# Patient Record
Sex: MALE | Race: OTHER | HISPANIC OR LATINO | Employment: FULL TIME | URBAN - METROPOLITAN AREA
[De-identification: names, ages, dates, MRNs, and addresses within clinical notes are randomized per-mention and may not be internally consistent; named-entity substitution may affect disease eponyms.]

---

## 2020-10-19 ENCOUNTER — OFFICE VISIT (OUTPATIENT)
Dept: INTERNAL MEDICINE CLINIC | Facility: CLINIC | Age: 30
End: 2020-10-19

## 2020-10-19 VITALS
TEMPERATURE: 96.5 F | BODY MASS INDEX: 33.6 KG/M2 | SYSTOLIC BLOOD PRESSURE: 130 MMHG | HEIGHT: 71 IN | HEART RATE: 74 BPM | WEIGHT: 240 LBS | DIASTOLIC BLOOD PRESSURE: 78 MMHG | OXYGEN SATURATION: 98 %

## 2020-10-19 DIAGNOSIS — E66.9 OBESITY (BMI 30.0-34.9): ICD-10-CM

## 2020-10-19 DIAGNOSIS — Z11.4 SCREENING FOR HIV (HUMAN IMMUNODEFICIENCY VIRUS): ICD-10-CM

## 2020-10-19 DIAGNOSIS — Z00.00 ROUTINE ADULT HEALTH MAINTENANCE: Primary | ICD-10-CM

## 2020-10-19 DIAGNOSIS — Z13.0 SCREENING FOR DEFICIENCY ANEMIA: ICD-10-CM

## 2020-10-19 DIAGNOSIS — Z13.29 SCREENING FOR THYROID DISORDER: ICD-10-CM

## 2020-10-19 DIAGNOSIS — Z13.220 SCREENING, LIPID: ICD-10-CM

## 2020-10-19 DIAGNOSIS — Z13.1 SCREENING FOR DIABETES MELLITUS: ICD-10-CM

## 2020-10-19 PROCEDURE — 3725F SCREEN DEPRESSION PERFORMED: CPT | Performed by: PHYSICIAN ASSISTANT

## 2020-10-19 PROCEDURE — 1036F TOBACCO NON-USER: CPT | Performed by: PHYSICIAN ASSISTANT

## 2020-10-19 PROCEDURE — 99385 PREV VISIT NEW AGE 18-39: CPT | Performed by: PHYSICIAN ASSISTANT

## 2020-10-19 PROCEDURE — 3008F BODY MASS INDEX DOCD: CPT | Performed by: PHYSICIAN ASSISTANT

## 2020-10-28 ENCOUNTER — LAB (OUTPATIENT)
Dept: LAB | Facility: HOSPITAL | Age: 30
End: 2020-10-28
Payer: COMMERCIAL

## 2020-10-28 DIAGNOSIS — Z13.1 SCREENING FOR DIABETES MELLITUS: ICD-10-CM

## 2020-10-28 DIAGNOSIS — Z13.220 SCREENING, LIPID: ICD-10-CM

## 2020-10-28 DIAGNOSIS — Z11.4 SCREENING FOR HIV (HUMAN IMMUNODEFICIENCY VIRUS): ICD-10-CM

## 2020-10-28 DIAGNOSIS — Z13.0 SCREENING FOR DEFICIENCY ANEMIA: ICD-10-CM

## 2020-10-28 DIAGNOSIS — Z13.29 SCREENING FOR THYROID DISORDER: ICD-10-CM

## 2020-10-28 DIAGNOSIS — E66.9 OBESITY (BMI 30.0-34.9): ICD-10-CM

## 2020-10-28 LAB
ALBUMIN SERPL BCP-MCNC: 3.9 G/DL (ref 3.5–5)
ALP SERPL-CCNC: 87 U/L (ref 46–116)
ALT SERPL W P-5'-P-CCNC: 57 U/L (ref 12–78)
ANION GAP SERPL CALCULATED.3IONS-SCNC: 12 MMOL/L (ref 4–13)
AST SERPL W P-5'-P-CCNC: 19 U/L (ref 5–45)
BASOPHILS # BLD AUTO: 0.03 THOUSANDS/ΜL (ref 0–0.1)
BASOPHILS NFR BLD AUTO: 0 % (ref 0–1)
BILIRUB SERPL-MCNC: 0.32 MG/DL (ref 0.2–1)
BUN SERPL-MCNC: 14 MG/DL (ref 5–25)
CALCIUM SERPL-MCNC: 9.6 MG/DL (ref 8.3–10.1)
CHLORIDE SERPL-SCNC: 102 MMOL/L (ref 100–108)
CHOLEST SERPL-MCNC: 186 MG/DL (ref 50–200)
CO2 SERPL-SCNC: 25 MMOL/L (ref 21–32)
CREAT SERPL-MCNC: 0.88 MG/DL (ref 0.6–1.3)
EOSINOPHIL # BLD AUTO: 0.08 THOUSAND/ΜL (ref 0–0.61)
EOSINOPHIL NFR BLD AUTO: 1 % (ref 0–6)
ERYTHROCYTE [DISTWIDTH] IN BLOOD BY AUTOMATED COUNT: 11.9 % (ref 11.6–15.1)
GFR SERPL CREATININE-BSD FRML MDRD: 115 ML/MIN/1.73SQ M
GLUCOSE P FAST SERPL-MCNC: 147 MG/DL (ref 65–99)
HCT VFR BLD AUTO: 48.7 % (ref 36.5–49.3)
HDLC SERPL-MCNC: 34 MG/DL
HGB BLD-MCNC: 16 G/DL (ref 12–17)
IMM GRANULOCYTES # BLD AUTO: 0.02 THOUSAND/UL (ref 0–0.2)
IMM GRANULOCYTES NFR BLD AUTO: 0 % (ref 0–2)
LDLC SERPL CALC-MCNC: 125 MG/DL (ref 0–100)
LYMPHOCYTES # BLD AUTO: 3.12 THOUSANDS/ΜL (ref 0.6–4.47)
LYMPHOCYTES NFR BLD AUTO: 42 % (ref 14–44)
MCH RBC QN AUTO: 29.9 PG (ref 26.8–34.3)
MCHC RBC AUTO-ENTMCNC: 32.9 G/DL (ref 31.4–37.4)
MCV RBC AUTO: 91 FL (ref 82–98)
MONOCYTES # BLD AUTO: 0.65 THOUSAND/ΜL (ref 0.17–1.22)
MONOCYTES NFR BLD AUTO: 9 % (ref 4–12)
NEUTROPHILS # BLD AUTO: 3.52 THOUSANDS/ΜL (ref 1.85–7.62)
NEUTS SEG NFR BLD AUTO: 48 % (ref 43–75)
NONHDLC SERPL-MCNC: 152 MG/DL
NRBC BLD AUTO-RTO: 0 /100 WBCS
PLATELET # BLD AUTO: 241 THOUSANDS/UL (ref 149–390)
PMV BLD AUTO: 11 FL (ref 8.9–12.7)
POTASSIUM SERPL-SCNC: 3.9 MMOL/L (ref 3.5–5.3)
PROT SERPL-MCNC: 8.1 G/DL (ref 6.4–8.2)
RBC # BLD AUTO: 5.36 MILLION/UL (ref 3.88–5.62)
SODIUM SERPL-SCNC: 139 MMOL/L (ref 136–145)
TRIGL SERPL-MCNC: 133 MG/DL
TSH SERPL DL<=0.05 MIU/L-ACNC: 2.38 UIU/ML (ref 0.36–3.74)
WBC # BLD AUTO: 7.42 THOUSAND/UL (ref 4.31–10.16)

## 2020-10-28 PROCEDURE — 84443 ASSAY THYROID STIM HORMONE: CPT

## 2020-10-28 PROCEDURE — 85025 COMPLETE CBC W/AUTO DIFF WBC: CPT

## 2020-10-28 PROCEDURE — 36415 COLL VENOUS BLD VENIPUNCTURE: CPT

## 2020-10-28 PROCEDURE — 83036 HEMOGLOBIN GLYCOSYLATED A1C: CPT

## 2020-10-28 PROCEDURE — 80053 COMPREHEN METABOLIC PANEL: CPT

## 2020-10-28 PROCEDURE — 80061 LIPID PANEL: CPT

## 2020-10-28 PROCEDURE — 87389 HIV-1 AG W/HIV-1&-2 AB AG IA: CPT

## 2020-10-29 LAB
EST. AVERAGE GLUCOSE BLD GHB EST-MCNC: 192 MG/DL
HBA1C MFR BLD: 8.3 %
HIV 1+2 AB+HIV1 P24 AG SERPL QL IA: NORMAL

## 2020-10-29 PROCEDURE — 3052F HG A1C>EQUAL 8.0%<EQUAL 9.0%: CPT | Performed by: PHYSICIAN ASSISTANT

## 2020-11-10 ENCOUNTER — OFFICE VISIT (OUTPATIENT)
Dept: INTERNAL MEDICINE CLINIC | Facility: CLINIC | Age: 30
End: 2020-11-10

## 2020-11-10 VITALS
BODY MASS INDEX: 32.87 KG/M2 | HEIGHT: 71 IN | SYSTOLIC BLOOD PRESSURE: 104 MMHG | WEIGHT: 234.8 LBS | HEART RATE: 68 BPM | DIASTOLIC BLOOD PRESSURE: 72 MMHG | TEMPERATURE: 97.3 F | OXYGEN SATURATION: 96 %

## 2020-11-10 DIAGNOSIS — E66.09 CLASS 1 OBESITY DUE TO EXCESS CALORIES WITH SERIOUS COMORBIDITY AND BODY MASS INDEX (BMI) OF 32.0 TO 32.9 IN ADULT: ICD-10-CM

## 2020-11-10 DIAGNOSIS — E11.9 TYPE 2 DIABETES MELLITUS WITHOUT COMPLICATION, WITHOUT LONG-TERM CURRENT USE OF INSULIN (HCC): Primary | ICD-10-CM

## 2020-11-10 DIAGNOSIS — E78.2 MIXED HYPERLIPIDEMIA: ICD-10-CM

## 2020-11-10 DIAGNOSIS — Z71.2 ENCOUNTER TO DISCUSS TEST RESULTS: ICD-10-CM

## 2020-11-10 PROBLEM — E66.811 CLASS 1 OBESITY DUE TO EXCESS CALORIES WITH SERIOUS COMORBIDITY AND BODY MASS INDEX (BMI) OF 32.0 TO 32.9 IN ADULT: Status: ACTIVE | Noted: 2020-11-10

## 2020-11-10 PROCEDURE — 3008F BODY MASS INDEX DOCD: CPT | Performed by: PHYSICIAN ASSISTANT

## 2020-11-10 PROCEDURE — 1036F TOBACCO NON-USER: CPT | Performed by: PHYSICIAN ASSISTANT

## 2020-11-10 PROCEDURE — 99214 OFFICE O/P EST MOD 30 MIN: CPT | Performed by: PHYSICIAN ASSISTANT

## 2021-02-09 ENCOUNTER — OFFICE VISIT (OUTPATIENT)
Dept: INTERNAL MEDICINE CLINIC | Facility: CLINIC | Age: 31
End: 2021-02-09

## 2021-02-09 VITALS
HEART RATE: 62 BPM | SYSTOLIC BLOOD PRESSURE: 124 MMHG | TEMPERATURE: 98.9 F | HEIGHT: 71 IN | OXYGEN SATURATION: 98 % | WEIGHT: 229.4 LBS | DIASTOLIC BLOOD PRESSURE: 77 MMHG | BODY MASS INDEX: 32.11 KG/M2

## 2021-02-09 DIAGNOSIS — E78.2 MIXED HYPERLIPIDEMIA: ICD-10-CM

## 2021-02-09 DIAGNOSIS — E11.9 TYPE 2 DIABETES MELLITUS WITHOUT COMPLICATION, WITHOUT LONG-TERM CURRENT USE OF INSULIN (HCC): Primary | ICD-10-CM

## 2021-02-09 DIAGNOSIS — E66.09 CLASS 1 OBESITY DUE TO EXCESS CALORIES WITH SERIOUS COMORBIDITY AND BODY MASS INDEX (BMI) OF 32.0 TO 32.9 IN ADULT: ICD-10-CM

## 2021-02-09 LAB — SL AMB POCT HEMOGLOBIN AIC: 6.2 (ref ?–6.5)

## 2021-02-09 PROCEDURE — 99214 OFFICE O/P EST MOD 30 MIN: CPT | Performed by: PHYSICIAN ASSISTANT

## 2021-02-09 PROCEDURE — 1036F TOBACCO NON-USER: CPT | Performed by: PHYSICIAN ASSISTANT

## 2021-02-09 PROCEDURE — 3044F HG A1C LEVEL LT 7.0%: CPT | Performed by: PHYSICIAN ASSISTANT

## 2021-02-09 PROCEDURE — 83036 HEMOGLOBIN GLYCOSYLATED A1C: CPT | Performed by: PHYSICIAN ASSISTANT

## 2021-02-09 PROCEDURE — 3008F BODY MASS INDEX DOCD: CPT | Performed by: PHYSICIAN ASSISTANT

## 2021-02-09 NOTE — ASSESSMENT & PLAN NOTE
Continued efforts for weight loss, applauded patient and encouraged him as he states he would like to get down to 200 lb  BMI currently 31 99  He understands the role weight loss please with improving his medical conditions  We will continue to monitor

## 2021-02-09 NOTE — PATIENT INSTRUCTIONS
Decrease your metformin to 500mg once a day with breakfast    Continue working on diet, exercise and weight loss! Please get fasting bloodwork done on or afer may 1st 2021    Please follow up in office after that       Please look into scheduling an appointment with eye doctor and dentist

## 2021-02-09 NOTE — ASSESSMENT & PLAN NOTE
I suspect improvement with his cholesterol levels with his change to his diet as well as weight loss  Will plan to recheck lipid panel in 3 months

## 2021-02-09 NOTE — PROGRESS NOTES
Assessment/Plan:    Type 2 diabetes mellitus without complication, without long-term current use of insulin [facility])    Lab Results   Component Value Date    HGBA1C 6 2 02/09/2021     New onset diabetes October 2020 with hemoglobin A1c 8 3  Patient initiated on metformin 1000 mg b i d   Patient reports that he is only taking metformin once a day due to some GI intolerance but hemoglobin A1c 6 2 today, significantly improved  He also reports significant improvement and changes to his diet as well as exercising for 10 minutes a day  He has lost 11 lb since his initial visit with me in October which is excellent  Patient reports that his sugars in the morning when he checks them are generally low 100s, 110s and sugars a few hours after his lunch are 90 still low 100s  At this time with his significant improvement in his diet, weight loss and continued effort or lifestyle improvement and significant improvement with his A1c I will lower metformin to 500 mg once a day and continue to monitor  We will plan on rechecking blood work in about 3 months including a fasting sugar, kidney function, A1c and urine microalbumin  Unfortunately our iris machine is not working today but I have recommended patient look into seeing an eye doctor with his medical insurance  I explained to patient concern for retinopathy with diabetes and the importance of having an eye exam once a year  Foot exam performed today and is normal     Mixed hyperlipidemia    I suspect improvement with his cholesterol levels with his change to his diet as well as weight loss  Will plan to recheck lipid panel in 3 months  Class 1 obesity due to excess calories with serious comorbidity and body mass index (BMI) of 32 0 to 32 9 in adult    Continued efforts for weight loss, applauded patient and encouraged him as he states he would like to get down to 200 lb  BMI currently 31 99    He understands the role weight loss please with improving his medical conditions  We will continue to monitor  Diagnoses and all orders for this visit:    Type 2 diabetes mellitus without complication, without long-term current use of insulin ([facility])  -     POCT hemoglobin A1c  -     metFORMIN (GLUCOPHAGE) 500 mg tablet; Take 1 tab PO daily with food in AM  -     Comprehensive metabolic panel; Future  -     Hemoglobin A1C; Future  -     Microalbumin / creatinine urine ratio; Future    Class 1 obesity due to excess calories with serious comorbidity and body mass index (BMI) of 32 0 to 32 9 in adult    Mixed hyperlipidemia  -     Lipid panel; Future        39-year-old male presenting today for diabetes follow-up, POC A1c and assessment of his medical conditions with assessment, plan and discussion as above  We will plan to see patient back in about 3-4 months time with fasting blood work prior  Foot exam updated today, patient advised to look into getting an eye exams ears medical insurance  He declines vaccine today  Chief Complaint   Patient presents with    Follow-up    Diabetes       Subjective:      Patient ID: Sandeep Breaux is a 31 y o  male  28y/o male here today for f/u to his diabetes, new onset, found on BW after  visit Oct 2020  Was started on metformin 1000mg BID  Previous A1C 8 3  Today POC A1C 6 2  He reports he is only taking 1 pill metformin in AM secondary to some GI side effects  He is eating grain bread and cheese for breakfast, green bananas for lunch and egg, bananas and meat for dinner, sometimes small amount of rice  He is drinking less juice an soda, more water  He is doing little exercises for 10min every day in home  He feels well  He has no concerns today      Needs foot exam, eye exam and dental exam        The following portions of the patient's history were reviewed and updated as appropriate: allergies, current medications, past family history, past medical history, past social history, past surgical history and problem list     Review of Systems   Constitutional: Negative  Eyes:        Does note some difficulty at times seeing far   Respiratory: Negative  Cardiovascular: Negative  Gastrointestinal: Negative  Endocrine: Negative  Genitourinary: Negative  Skin: Negative  Neurological: Negative  Objective:      /77 (BP Location: Right arm, Patient Position: Sitting, Cuff Size: Standard)   Pulse 62   Temp 98 9 °F (37 2 °C) (Temporal)   Ht 5' 11" (1 803 m)   Wt 104 kg (229 lb 6 4 oz)   SpO2 98%   BMI 31 99 kg/m²          Physical Exam  Vitals signs reviewed  Constitutional:       General: He is not in acute distress  Appearance: He is obese  He is not ill-appearing or toxic-appearing  Comments: Patient has lost 5 more lbs since nov 2020 visit  BMI currently 31 99   HENT:      Head: Normocephalic and atraumatic  Eyes:      Conjunctiva/sclera: Conjunctivae normal    Neck:      Musculoskeletal: Neck supple  Cardiovascular:      Rate and Rhythm: Normal rate and regular rhythm  Pulses: Normal pulses  no weak pulses          Dorsalis pedis pulses are 2+ on the right side and 2+ on the left side  Heart sounds: Normal heart sounds  Pulmonary:      Effort: Pulmonary effort is normal       Breath sounds: Normal breath sounds  Musculoskeletal:      Right lower leg: No edema  Left lower leg: No edema  Feet:      Right foot:      Skin integrity: No ulcer, skin breakdown, erythema, warmth, callus or dry skin  Left foot:      Skin integrity: No ulcer, skin breakdown, erythema, warmth, callus or dry skin  Lymphadenopathy:      Head:      Right side of head: No submandibular or tonsillar adenopathy  Left side of head: No submandibular or tonsillar adenopathy  Neurological:      Mental Status: He is alert and oriented to person, place, and time     Psychiatric:         Mood and Affect: Mood normal          Speech: Speech normal          Behavior: Behavior normal  Behavior is cooperative  Patient's shoes and socks removed  Right Foot/Ankle   Right Foot Inspection  Skin Exam: skin normal and skin intact no dry skin, no warmth, no callus, no erythema, no maceration, no abnormal color, no pre-ulcer, no ulcer and no callus                          Toe Exam: ROM and strength within normal limits  Sensory     Proprioception: intact   Monofilament testing: intact  Vascular  Capillary refills: < 3 seconds  The right DP pulse is 2+  Left Foot/Ankle  Left Foot Inspection  Skin Exam: skin normal and skin intactno dry skin, no warmth, no erythema, no maceration, normal color, no pre-ulcer, no ulcer and no callus                         Toe Exam: ROM and strength within normal limits                   Sensory     Proprioception: intact  Monofilament: intact  Vascular  Capillary refills: < 3 seconds  The left DP pulse is 2+  Assign Risk Category:  No deformity present; No loss of protective sensation;  No weak pulses       Risk: 0

## 2021-02-09 NOTE — ASSESSMENT & PLAN NOTE
Lab Results   Component Value Date    HGBA1C 6 2 02/09/2021     New onset diabetes October 2020 with hemoglobin A1c 8 3  Patient initiated on metformin 1000 mg b i d   Patient reports that he is only taking metformin once a day due to some GI intolerance but hemoglobin A1c 6 2 today, significantly improved  He also reports significant improvement and changes to his diet as well as exercising for 10 minutes a day  He has lost 11 lb since his initial visit with me in October which is excellent  Patient reports that his sugars in the morning when he checks them are generally low 100s, 110s and sugars a few hours after his lunch are 90 still low 100s  At this time with his significant improvement in his diet, weight loss and continued effort or lifestyle improvement and significant improvement with his A1c I will lower metformin to 500 mg once a day and continue to monitor  We will plan on rechecking blood work in about 3 months including a fasting sugar, kidney function, A1c and urine microalbumin  Unfortunately our iris machine is not working today but I have recommended patient look into seeing an eye doctor with his medical insurance  I explained to patient concern for retinopathy with diabetes and the importance of having an eye exam once a year        Foot exam performed today and is normal

## 2022-03-31 DIAGNOSIS — E11.9 TYPE 2 DIABETES MELLITUS WITHOUT COMPLICATION, WITHOUT LONG-TERM CURRENT USE OF INSULIN (HCC): ICD-10-CM

## 2023-05-11 ENCOUNTER — OFFICE VISIT (OUTPATIENT)
Dept: INTERNAL MEDICINE CLINIC | Facility: CLINIC | Age: 33
End: 2023-05-11

## 2023-05-11 VITALS
TEMPERATURE: 98.6 F | HEART RATE: 78 BPM | DIASTOLIC BLOOD PRESSURE: 85 MMHG | HEIGHT: 71 IN | BODY MASS INDEX: 33.6 KG/M2 | SYSTOLIC BLOOD PRESSURE: 131 MMHG | WEIGHT: 240 LBS | OXYGEN SATURATION: 95 %

## 2023-05-11 DIAGNOSIS — E78.2 MIXED HYPERLIPIDEMIA: ICD-10-CM

## 2023-05-11 DIAGNOSIS — Z13.29 SCREENING FOR THYROID DISORDER: ICD-10-CM

## 2023-05-11 DIAGNOSIS — Z11.59 ENCOUNTER FOR HEPATITIS C SCREENING TEST FOR LOW RISK PATIENT: ICD-10-CM

## 2023-05-11 DIAGNOSIS — E11.9 TYPE 2 DIABETES MELLITUS WITHOUT COMPLICATION, WITHOUT LONG-TERM CURRENT USE OF INSULIN (HCC): Primary | ICD-10-CM

## 2023-05-11 LAB
LEFT EYE DIABETIC RETINOPATHY: NORMAL
LEFT EYE IMAGE QUALITY: NORMAL
LEFT EYE MACULAR EDEMA: NORMAL
LEFT EYE OTHER RETINOPATHY: NORMAL
RIGHT EYE DIABETIC RETINOPATHY: NORMAL
RIGHT EYE IMAGE QUALITY: NORMAL
RIGHT EYE MACULAR EDEMA: NORMAL
RIGHT EYE OTHER RETINOPATHY: NORMAL
SEVERITY (EYE EXAM): NORMAL
SL AMB POCT HEMOGLOBIN AIC: 12.3 (ref ?–6.5)

## 2023-05-11 RX ORDER — METFORMIN HYDROCHLORIDE 750 MG/1
TABLET, EXTENDED RELEASE ORAL
Qty: 60 TABLET | Refills: 2 | Status: SHIPPED | OUTPATIENT
Start: 2023-05-11

## 2023-05-11 NOTE — ASSESSMENT & PLAN NOTE
Lab Results   Component Value Date    CHOLESTEROL 186 10/28/2020     Lab Results   Component Value Date    HDL 34 (L) 10/28/2020     Lab Results   Component Value Date    TRIG 133 10/28/2020     Lab Results   Component Value Date    [place] 152 10/28/2020     Lab Results   Component Value Date    LDLCALC 125 (H) 10/28/2020     Will repeat lipid panel

## 2023-05-11 NOTE — ASSESSMENT & PLAN NOTE
Lab Results   Component Value Date    HGBA1C 12 3 (A) 05/11/2023     Patient has been noncompliant with his medications and office visits  Significant increase from 6 22 years ago  Reviewed nutrition and diet recommendations  We will send referral to diabetes educator  Stressed the importance of glucose control to prevent complications  Patient currently refuses all injectables  He was agreeable to restart metformin  Concern for diarrhea as he was previously on 1000 twice a day and had upset stomach and diarrhea  This was on the regular version and not extended release  We will start extended release 750 mg once daily for 2 weeks then increase to twice daily if tolerated  Also recommended starting a second oral medication as he refuses GLP 1 agonist and insulin  He refuses at this time  Iris performed today  Foot exam performed today  We will get urine microalbumin to creatinine ratio  Follow-up in 3 months, sooner if needed  Stressed the importance of compliance with lifestyle, medication, and office visits

## 2023-05-11 NOTE — PROGRESS NOTES
Name: Bailey Franks      : 1990      MRN: [id number]  Encounter Provider: Ryan Stapleton PA-C  Encounter Date: 2023   Encounter department: [address] [place] [address]    Assessment & Plan     1  Type 2 diabetes mellitus without complication, without long-term current use of insulin ([facility])  Assessment & Plan:    Lab Results   Component Value Date    HGBA1C 12 3 (A) 2023     Patient has been noncompliant with his medications and office visits  Significant increase from 6 22 years ago  Reviewed nutrition and diet recommendations  We will send referral to diabetes educator  Stressed the importance of glucose control to prevent complications  Patient currently refuses all injectables  He was agreeable to restart metformin  Concern for diarrhea as he was previously on 1000 twice a day and had upset stomach and diarrhea  This was on the regular version and not extended release  We will start extended release 750 mg once daily for 2 weeks then increase to twice daily if tolerated  Also recommended starting a second oral medication as he refuses GLP 1 agonist and insulin  He refuses at this time  Iris performed today  Foot exam performed today  We will get urine microalbumin to creatinine ratio  Follow-up in 3 months, sooner if needed  Stressed the importance of compliance with lifestyle, medication, and office visits  Orders:  -     POCT hemoglobin A1c  -     metFORMIN (GLUCOPHAGE-XR) 750 mg 24 hr tablet; Start one tablet for 2 weeks then one tablet twice a day  -     CBC and differential; Future  -     Albumin / creatinine urine ratio; Future  -     IRIS Diabetic eye exam  -     Ambulatory Referral to Diabetic Education; Future    2   Mixed hyperlipidemia  Assessment & Plan:  Lab Results   Component Value Date    CHOLESTEROL 186 10/28/2020     Lab Results   Component Value Date    HDL 34 (L) 10/28/2020     Lab Results   Component Value Date    TRIG 133 10/28/2020     Lab Results   Component Value Date    [place] 152 10/28/2020     Lab Results   Component Value Date    LDLCALC 125 (H) 10/28/2020     Will repeat lipid panel  Orders:  -     Comprehensive metabolic panel; Future  -     Lipid Panel with Direct LDL reflex; Future    3  BMI 33 0-33 9,adult    4  Screening for thyroid disorder  -     TSH, 3rd generation with Free T4 reflex; Future    5  Encounter for hepatitis C screening test for low risk patient  -     Hepatitis C antibody; Future    BMI Counseling: Body mass index is 33 47 kg/m²  The BMI is above normal  Nutrition recommendations include decreasing portion sizes, encouraging healthy choices of fruits and vegetables, decreasing fast food intake, consuming healthier snacks, limiting drinks that contain sugar, moderation in carbohydrate intake, increasing intake of lean protein, reducing intake of saturated and trans fat and reducing intake of cholesterol  Exercise recommendations include moderate physical activity 150 minutes/week, exercising 3-5 times per week and strength training exercises  No pharmacotherapy was ordered  Rationale for BMI follow-up plan is due to patient being overweight or obese  Depression Screening and Follow-up Plan: Patient was screened for depression during today's encounter  They screened negative with a PHQ-2 score of 0  Subjective      Patient is a 27-year-old male with past medical history of type 2 diabetes presenting for follow-up  He has been noncompliant with his metformin  States he has not really been taking it  He cannot really give an excuse for this  Just states he has not been here in a long time  He does check his sugar at home  States he bought a machine on [employer]  He has not been checking his sugar recently  He overall feels well and offers no complaints today      Review of Systems   Constitutional: Negative for appetite change, chills, diaphoresis, fatigue, fever and unexpected weight "change  Eyes: Negative for visual disturbance  Respiratory: Negative for cough, chest tightness, shortness of breath and wheezing  Cardiovascular: Negative for chest pain, palpitations and leg swelling  Gastrointestinal: Negative for abdominal pain, blood in stool, constipation, diarrhea, nausea and vomiting  Endocrine: Negative for cold intolerance, heat intolerance, polydipsia, polyphagia and polyuria  Musculoskeletal: Negative for arthralgias and myalgias  Skin: Negative for rash  Neurological: Negative for dizziness, weakness, light-headedness, numbness and headaches  Hematological: Negative for adenopathy  Current Outpatient Medications on File Prior to Visit   Medication Sig   • [DISCONTINUED] metFORMIN (GLUCOPHAGE) 500 mg tablet Take 1 tab PO daily with food in AM       Objective     /85 (BP Location: Left arm, Patient Position: Sitting, Cuff Size: Large)   Pulse 78   Temp 98 6 °F (37 °C) (Temporal)   Ht 5' 11\" (1 803 m)   Wt 109 kg (240 lb)   SpO2 95%   BMI 33 47 kg/m²     Physical Exam  Vitals and nursing note reviewed  Constitutional:       General: He is awake  He is not in acute distress  Appearance: Normal appearance  He is well-developed and well-groomed  He is obese  He is not ill-appearing  HENT:      Head: Normocephalic and atraumatic  Eyes:      General: No scleral icterus  Conjunctiva/sclera: Conjunctivae normal    Cardiovascular:      Rate and Rhythm: Normal rate and regular rhythm  Pulses: Normal pulses  no weak pulses          Radial pulses are 2+ on the right side and 2+ on the left side  Dorsalis pedis pulses are 2+ on the right side and 2+ on the left side  Posterior tibial pulses are 2+ on the right side and 2+ on the left side  Heart sounds: Normal heart sounds  No murmur heard  Pulmonary:      Effort: Pulmonary effort is normal  No respiratory distress  Breath sounds: Normal breath sounds and air entry   No " decreased air movement  No decreased breath sounds, wheezing, rhonchi or rales  Abdominal:      General: Abdomen is flat  Bowel sounds are normal  There is no distension  Palpations: Abdomen is soft  There is no mass  Tenderness: There is no abdominal tenderness  There is no right CVA tenderness, left CVA tenderness, guarding or rebound  Hernia: No hernia is present  Musculoskeletal:         General: Normal range of motion  Cervical back: Neck supple  Right lower leg: No edema  Left lower leg: No edema  Feet:      Right foot:      Skin integrity: No ulcer, skin breakdown, erythema, warmth, callus or dry skin  Left foot:      Skin integrity: No ulcer, skin breakdown, erythema, warmth, callus or dry skin  Lymphadenopathy:      Cervical: No cervical adenopathy  Skin:     General: Skin is warm  Coloration: Skin is not jaundiced  Findings: No rash  Neurological:      General: No focal deficit present  Mental Status: He is alert and oriented to person, place, and time  Mental status is at baseline  Sensory: Sensation is intact  Motor: Motor function is intact  Coordination: Coordination is intact  Gait: Gait is intact  Psychiatric:         Attention and Perception: Attention normal          Mood and Affect: Mood and affect normal          Speech: Speech normal          Behavior: Behavior normal  Behavior is cooperative  Cognition and Memory: Cognition normal           Patient's shoes and socks removed  Right Foot/Ankle   Right Foot Inspection  Skin Exam: skin normal and skin intact  No dry skin, no warmth, no callus, no erythema, no maceration, no abnormal color, no pre-ulcer, no ulcer and no callus  Toe Exam: ROM and strength within normal limits  Sensory   Proprioception: intact  Monofilament testing: intact    Vascular  Capillary refills: < 3 seconds  The right DP pulse is 2+  The right PT pulse is 2+       Left Foot/Ankle  Left Foot Inspection  Skin Exam: skin normal and skin intact  No dry skin, no warmth, no erythema, no maceration, normal color, no pre-ulcer, no ulcer and no callus  Toe Exam: ROM and strength within normal limits  Sensory   Proprioception: intact  Monofilament testing: intact    Vascular  Capillary refills: < 3 seconds  The left DP pulse is 2+  The left PT pulse is 2+       Assign Risk Category  No deformity present  No loss of protective sensation  No weak pulses  Risk: Gladys Be 399, PA-C

## 2023-05-18 ENCOUNTER — TELEPHONE (OUTPATIENT)
Dept: DIABETES SERVICES | Facility: OTHER | Age: 33
End: 2023-05-18

## 2023-06-06 ENCOUNTER — TELEPHONE (OUTPATIENT)
Dept: DIABETES SERVICES | Facility: OTHER | Age: 33
End: 2023-06-06

## 2023-08-31 ENCOUNTER — TELEPHONE (OUTPATIENT)
Dept: INTERNAL MEDICINE CLINIC | Facility: CLINIC | Age: 33
End: 2023-08-31

## 2024-04-10 ENCOUNTER — OFFICE VISIT (OUTPATIENT)
Dept: INTERNAL MEDICINE CLINIC | Facility: CLINIC | Age: 34
End: 2024-04-10

## 2024-04-10 VITALS
DIASTOLIC BLOOD PRESSURE: 84 MMHG | WEIGHT: 243 LBS | TEMPERATURE: 98.1 F | HEART RATE: 87 BPM | HEIGHT: 71 IN | SYSTOLIC BLOOD PRESSURE: 131 MMHG | BODY MASS INDEX: 34.02 KG/M2

## 2024-04-10 DIAGNOSIS — Z00.00 ANNUAL PHYSICAL EXAM: Primary | ICD-10-CM

## 2024-04-10 DIAGNOSIS — E11.9 TYPE 2 DIABETES MELLITUS WITHOUT COMPLICATION, WITHOUT LONG-TERM CURRENT USE OF INSULIN (HCC): ICD-10-CM

## 2024-04-10 PROCEDURE — 99385 PREV VISIT NEW AGE 18-39: CPT | Performed by: INTERNAL MEDICINE

## 2024-04-10 PROCEDURE — 83036 HEMOGLOBIN GLYCOSYLATED A1C: CPT | Performed by: INTERNAL MEDICINE

## 2024-04-10 RX ORDER — GLIPIZIDE 2.5 MG/1
2.5 TABLET ORAL
Qty: 180 TABLET | Refills: 1 | Status: SHIPPED | OUTPATIENT
Start: 2024-04-10

## 2024-04-10 RX ORDER — METFORMIN HYDROCHLORIDE 500 MG/1
1000 TABLET, EXTENDED RELEASE ORAL 2 TIMES DAILY WITH MEALS
Qty: 360 TABLET | Refills: 1 | Status: CANCELLED | OUTPATIENT
Start: 2024-04-10

## 2024-04-10 NOTE — PROGRESS NOTES
ADULT ANNUAL PHYSICAL  [facility] [place]    NAME: Nabeel Parra  AGE: 33 y.o. SEX: male  : 1990     DATE: 2024     Assessment and Plan:     Problem List Items Addressed This Visit          Endocrine    Type 2 diabetes mellitus without complication, without long-term current use of insulin (HCC)    Relevant Medications    glipiZIDE 2.5 MG TABS    Other Relevant Orders    Ambulatory Referral to Diabetic Education    Comprehensive metabolic panel    Albumin / creatinine urine ratio    Anti-islet cell antibody    Glutamic acid decarboxylase    Ambulatory Referral to Endocrinology     Other Visit Diagnoses       Annual physical exam    -  Primary    Relevant Orders    TSH, 3rd generation with Free T4 reflex    CBC and differential    Lipid panel            Annual Physical  Blood work ordered, up to date on other screenings.     Diabetes Mellitus  A1c 12.1%, discussed at length with the patient about the risk and complications of uncontrolled diabetes.  Discussed different treatment options as well as dietary and lifestyle modifications to help him have better glycemic control.  Will obtain blood work to rule out type 1 diabetes as well as referral to endocrinology for concerns of monogenic diabetes mellitus given family history.  Patient would not like to be on insulin at this time, is tolerating metformin okay.  Will start on glipizide 2.5 mg twice daily with plans to increase at next visit as long as patient is able to tolerate.    Immunizations and preventive care screenings were discussed with patient today. Appropriate education was printed on patient's after visit summary.    Counseling:  Alcohol/drug use: discussed moderation in alcohol intake, the recommendations for healthy alcohol use, and avoidance of illicit drug use.  Dental Health: discussed importance of regular tooth brushing, flossing, and dental visits.  Injury prevention:  discussed safety/seat belts, safety helmets, smoke detectors, carbon dioxide detectors, and smoking near bedding or upholstery.  Sexual health: discussed sexually transmitted diseases, partner selection, use of condoms, avoidance of unintended pregnancy, and contraceptive alternatives.  Exercise: the importance of regular exercise/physical activity was discussed. Recommend exercise 3-5 times per week for at least 30 minutes.     BMI Counseling: Body mass index is 33.89 kg/m². The BMI is above normal. Nutrition recommendations include decreasing portion sizes, consuming healthier snacks, limiting drinks that contain sugar and moderation in carbohydrate intake. Exercise recommendations include moderate physical activity 150 minutes/week, exercising 3-5 times per week, obtaining a gym membership and strength training exercises. No pharmacotherapy was ordered. Rationale for BMI follow-up plan is due to patient being overweight or obese.     Depression Screening and Follow-up Plan: Patient was screened for depression during today's encounter. They screened negative with a PHQ-2 score of 0.        Return in about 6 weeks (around 5/22/2024) for f/u diabetes, glipizide initiation, pt will bring logs.     Chief Complaint:     Chief Complaint   Patient presents with    Physical Exam     a1c      History of Present Illness:     Adult Annual Physical   Patient here for a comprehensive physical exam. The patient reports no problems. Works at a warehouse, on his feet a lot. Enjoys basketball and swimming.  Patient notes that multiple members of his family have diabetes and he believes that he had been diagnosed with diabetes in their early 20s.    Diet and Physical Activity  Diet/Nutrition:  rice, meat, home cooked, drinks juices .   Exercise: no formal exercise.      Depression Screening  PHQ-2/9 Depression Screening    Little interest or pleasure in doing things: 0 - not at all  Feeling down, depressed, or hopeless: 0 - not at  all  PHQ-2 Score: 0  PHQ-2 Interpretation: Negative depression screen       General Health  Sleep: gets 4-6 hours of sleep on average.   Hearing: normal - bilateral.  Vision: goes for regular eye exams and wears glasses.   Dental: regular dental visits and brushes teeth twice daily.        Health  History of STDs?: no.     Review of Systems:     Review of Systems   Constitutional:  Negative for chills, fatigue and fever.   Respiratory:  Negative for cough, chest tightness, shortness of breath and wheezing.    Cardiovascular:  Negative for chest pain, palpitations and leg swelling.   Gastrointestinal:  Negative for abdominal pain, constipation, diarrhea, nausea and vomiting.   Genitourinary:  Positive for frequency.   Neurological:  Negative for dizziness, weakness, light-headedness, numbness and headaches.   Psychiatric/Behavioral: Negative.     All other systems reviewed and are negative.     Past Medical History:     History reviewed. No pertinent past medical history.   Past Surgical History:     History reviewed. No pertinent surgical history.   Social History:     Social History     Socioeconomic History    Marital status: /Civil Union     Spouse name: None    Number of children: None    Years of education: None    Highest education level: None   Occupational History    None   Tobacco Use    Smoking status: Never    Smokeless tobacco: Never   Vaping Use    Vaping status: Some Days    Substances: Flavoring   Substance and Sexual Activity    Alcohol use: Yes    Drug use: Never    Sexual activity: Yes     Partners: Female     Birth control/protection: None   Other Topics Concern    None   Social History Narrative    None     Social Determinants of Health     Financial Resource Strain: Low Risk  (4/10/2024)    Overall Financial Resource Strain (CARDIA)     Difficulty of Paying Living Expenses: Not hard at all   Food Insecurity: No Food Insecurity (4/10/2024)    Hunger Vital Sign     Worried About Running  "Out of Food in the Last Year: Never true     Ran Out of Food in the Last Year: Never true   Transportation Needs: No Transportation Needs (4/10/2024)    PRAPARE - Transportation     Lack of Transportation (Medical): No     Lack of Transportation (Non-Medical): No   Physical Activity: Not on file   Stress: Not on file   Social Connections: Not on file   Intimate Partner Violence: Not on file   Housing Stability: Low Risk  (4/10/2024)    Housing Stability Vital Sign     Unable to Pay for Housing in the Last Year: No     Number of Places Lived in the Last Year: 1     Unstable Housing in the Last Year: No      Family History:     Family History   Problem Relation Age of Onset    Diabetes Mother     Diabetes Father       Current Medications:     Current Outpatient Medications   Medication Sig Dispense Refill    glipiZIDE 2.5 MG TABS Take 2.5 mg by mouth 2 (two) times a day before meals 180 tablet 1    metFORMIN (GLUCOPHAGE-XR) 750 mg 24 hr tablet Start one tablet for 2 weeks then one tablet twice a day 60 tablet 2     No current facility-administered medications for this visit.      Allergies:     No Known Allergies   Physical Exam:     /84 (BP Location: Right arm, Patient Position: Sitting, Cuff Size: Large)   Pulse 87   Temp 98.1 °F (36.7 °C) (Temporal)   Ht 5' 11\" (1.803 m)   Wt 110 kg (243 lb)   BMI 33.89 kg/m²     Physical Exam  Vitals reviewed.   Constitutional:       General: He is not in acute distress.     Appearance: Normal appearance. He is not ill-appearing.   HENT:      Head: Normocephalic and atraumatic.      Right Ear: Tympanic membrane, ear canal and external ear normal.      Left Ear: Tympanic membrane, ear canal and external ear normal.      Nose: Nose normal.      Mouth/Throat:      Mouth: Mucous membranes are moist.      Pharynx: Oropharynx is clear.   Eyes:      Conjunctiva/sclera: Conjunctivae normal.   Cardiovascular:      Rate and Rhythm: Normal rate and regular rhythm.      Pulses: " Normal pulses.      Heart sounds: Normal heart sounds. No murmur heard.  Pulmonary:      Effort: Pulmonary effort is normal. No respiratory distress.      Breath sounds: Normal breath sounds. No wheezing, rhonchi or rales.   Abdominal:      General: Abdomen is flat. Bowel sounds are normal. There is no distension.      Palpations: Abdomen is soft.      Tenderness: There is no abdominal tenderness. There is no guarding.   Musculoskeletal:         General: No swelling.      Right lower leg: No edema.      Left lower leg: No edema.   Skin:     General: Skin is warm and dry.      Capillary Refill: Capillary refill takes less than 2 seconds.      Coloration: Skin is not jaundiced.   Neurological:      General: No focal deficit present.      Mental Status: He is alert.   Psychiatric:         Mood and Affect: Mood normal.         Behavior: Behavior normal.          Jose Sainz DO   [facility] [place]

## 2024-04-11 LAB — SL AMB POCT HEMOGLOBIN AIC: 12.1 (ref ?–6.5)

## 2024-04-22 DIAGNOSIS — E11.9 TYPE 2 DIABETES MELLITUS WITHOUT COMPLICATION, WITHOUT LONG-TERM CURRENT USE OF INSULIN (HCC): ICD-10-CM

## 2024-04-22 RX ORDER — METFORMIN HYDROCHLORIDE 750 MG/1
TABLET, EXTENDED RELEASE ORAL
Qty: 60 TABLET | Refills: 2 | Status: SHIPPED | OUTPATIENT
Start: 2024-04-22

## 2024-04-30 ENCOUNTER — TELEPHONE (OUTPATIENT)
Dept: INTERNAL MEDICINE CLINIC | Facility: CLINIC | Age: 34
End: 2024-04-30

## 2024-04-30 NOTE — TELEPHONE ENCOUNTER
Received call from patient. Patient stating he was told to take 1000 mg of Metformin daily. Patient stating he only has script for 750 mg Metformin. Patient has been taking the glipizide as ordered.     Please review if patient should be on 750 mg or 1000 mg of Metformin.

## 2024-04-30 NOTE — TELEPHONE ENCOUNTER
Left voice message with patient per physician to take Metformin 750 mg once a day for a week. If tolerating well, then take Metformin 750 mg twice a day. Left contact number for patient for any further questions/concerns.

## 2024-04-30 NOTE — TELEPHONE ENCOUNTER
Given patient did not tolerate 1,000 mg twice a day previously, we will start with 750 mg. Patient should start with once a day and if he tolerates it well for a week, he can start taking twice a day. Thank you

## 2024-05-03 ENCOUNTER — NURSE TRIAGE (OUTPATIENT)
Dept: OTHER | Facility: OTHER | Age: 34
End: 2024-05-03

## 2024-05-03 NOTE — TELEPHONE ENCOUNTER
"Regarding: Dog bite  ----- Message from Marty Richter sent at 5/3/2024  6:48 PM EDT -----  \"My dog bit my right toe yesterday and I don't if I should get a vaccine or something.\"    "

## 2024-05-03 NOTE — TELEPHONE ENCOUNTER
"Small dog bite from dog while \"playing\" on right thumb. Patient denies dog is sick or behaving abnormally. Last vaccine three years ago. Unsure of Tetanus booster . No additional symptoms reported. Care advice given. Informed to call back if worsening/developing symptoms. Verbalized understanding. Agreeable with disposition. No further questions.  "

## 2024-05-03 NOTE — TELEPHONE ENCOUNTER
"Reason for Disposition  • [1] Puncture wound or small cut AND [2] on hands or genitals (Exception: puncture  from small pet such as gerbil, mouse, hamster, puppy or turtle)    Answer Assessment - Initial Assessment Questions  1. ANIMAL: \"What type of animal caused the bite?\" \"Is the injury from a bite or a claw?\" If the animal is a dog or a cat, ask: \"Was it a pet or a stray?\" \"Was it acting ill or behaving strangely?\"      DOG (pet)  2. LOCATION: \"Where is the bite located?\"      Right thumb   3. SIZE: \"How big is the bite?\" \"What does it look like?\"       \"Little\" Break skin  4. ONSET: \"When did the bite happen?\" (Minutes or hours ago)       5/2  5. CIRCUMSTANCES: \"Tell me how this happened.\"     Playing with dog, and snapped   6. TETANUS: \"When was the last tetanus booster?\"      Unsure    Protocols used: Animal Bite-ADULT-AH    "

## 2024-05-14 ENCOUNTER — TELEPHONE (OUTPATIENT)
Dept: INTERNAL MEDICINE CLINIC | Facility: OTHER | Age: 34
End: 2024-05-14

## 2024-05-17 DIAGNOSIS — E11.9 TYPE 2 DIABETES MELLITUS WITHOUT COMPLICATION, WITHOUT LONG-TERM CURRENT USE OF INSULIN (HCC): ICD-10-CM

## 2024-05-17 RX ORDER — METFORMIN HYDROCHLORIDE 750 MG/1
TABLET, EXTENDED RELEASE ORAL
Qty: 180 TABLET | Refills: 1 | Status: SHIPPED | OUTPATIENT
Start: 2024-05-17

## 2024-06-05 ENCOUNTER — TELEPHONE (OUTPATIENT)
Dept: INTERNAL MEDICINE CLINIC | Facility: CLINIC | Age: 34
End: 2024-06-05

## 2024-07-03 ENCOUNTER — TELEPHONE (OUTPATIENT)
Dept: INTERNAL MEDICINE CLINIC | Facility: CLINIC | Age: 34
End: 2024-07-03

## 2024-07-30 DIAGNOSIS — E11.9 TYPE 2 DIABETES MELLITUS WITHOUT COMPLICATION, WITHOUT LONG-TERM CURRENT USE OF INSULIN (HCC): ICD-10-CM

## 2024-07-30 RX ORDER — GLIPIZIDE 2.5 MG/1
2.5 TABLET ORAL
Qty: 180 TABLET | Refills: 2 | Status: SHIPPED | OUTPATIENT
Start: 2024-07-30

## 2024-07-31 ENCOUNTER — TELEPHONE (OUTPATIENT)
Dept: INTERNAL MEDICINE CLINIC | Facility: CLINIC | Age: 34
End: 2024-07-31

## 2024-09-24 ENCOUNTER — APPOINTMENT (OUTPATIENT)
Age: 34
End: 2024-09-24
Payer: COMMERCIAL

## 2024-09-24 ENCOUNTER — OFFICE VISIT (OUTPATIENT)
Age: 34
End: 2024-09-24
Payer: COMMERCIAL

## 2024-09-24 VITALS
WEIGHT: 232 LBS | OXYGEN SATURATION: 98 % | HEIGHT: 69 IN | BODY MASS INDEX: 34.36 KG/M2 | HEART RATE: 70 BPM | DIASTOLIC BLOOD PRESSURE: 82 MMHG | SYSTOLIC BLOOD PRESSURE: 130 MMHG | TEMPERATURE: 97.8 F

## 2024-09-24 DIAGNOSIS — E66.9 CLASS 1 OBESITY WITH SERIOUS COMORBIDITY AND BODY MASS INDEX (BMI) OF 34.0 TO 34.9 IN ADULT, UNSPECIFIED OBESITY TYPE: ICD-10-CM

## 2024-09-24 DIAGNOSIS — Z76.89 ENCOUNTER TO ESTABLISH CARE: ICD-10-CM

## 2024-09-24 DIAGNOSIS — Z11.59 NEED FOR HEPATITIS C SCREENING TEST: ICD-10-CM

## 2024-09-24 DIAGNOSIS — E66.811 CLASS 1 OBESITY WITH SERIOUS COMORBIDITY AND BODY MASS INDEX (BMI) OF 34.0 TO 34.9 IN ADULT, UNSPECIFIED OBESITY TYPE: ICD-10-CM

## 2024-09-24 DIAGNOSIS — E11.65 POORLY CONTROLLED TYPE 2 DIABETES MELLITUS (HCC): Primary | ICD-10-CM

## 2024-09-24 DIAGNOSIS — E11.65 POORLY CONTROLLED TYPE 2 DIABETES MELLITUS (HCC): ICD-10-CM

## 2024-09-24 LAB
25(OH)D3 SERPL-MCNC: 22.3 NG/ML (ref 30–100)
ALBUMIN SERPL BCG-MCNC: 4.5 G/DL (ref 3.5–5)
ALP SERPL-CCNC: 94 U/L (ref 34–104)
ALT SERPL W P-5'-P-CCNC: 54 U/L (ref 7–52)
ANION GAP SERPL CALCULATED.3IONS-SCNC: 13 MMOL/L (ref 4–13)
AST SERPL W P-5'-P-CCNC: 20 U/L (ref 13–39)
BASOPHILS # BLD AUTO: 0.05 THOUSANDS/ΜL (ref 0–0.1)
BASOPHILS NFR BLD AUTO: 1 % (ref 0–1)
BILIRUB SERPL-MCNC: 0.36 MG/DL (ref 0.2–1)
BUN SERPL-MCNC: 11 MG/DL (ref 5–25)
CALCIUM SERPL-MCNC: 9.6 MG/DL (ref 8.4–10.2)
CHLORIDE SERPL-SCNC: 98 MMOL/L (ref 96–108)
CHOLEST SERPL-MCNC: 250 MG/DL
CO2 SERPL-SCNC: 23 MMOL/L (ref 21–32)
CREAT SERPL-MCNC: 0.7 MG/DL (ref 0.6–1.3)
CREAT UR-MCNC: 45.6 MG/DL
EOSINOPHIL # BLD AUTO: 0.06 THOUSAND/ΜL (ref 0–0.61)
EOSINOPHIL NFR BLD AUTO: 1 % (ref 0–6)
ERYTHROCYTE [DISTWIDTH] IN BLOOD BY AUTOMATED COUNT: 12 % (ref 11.6–15.1)
EST. AVERAGE GLUCOSE BLD GHB EST-MCNC: 321 MG/DL
GFR SERPL CREATININE-BSD FRML MDRD: 123 ML/MIN/1.73SQ M
GLUCOSE P FAST SERPL-MCNC: 370 MG/DL (ref 65–99)
HBA1C MFR BLD: 12.8 %
HCT VFR BLD AUTO: 55.2 % (ref 36.5–49.3)
HCV AB SER QL: NORMAL
HDLC SERPL-MCNC: 42 MG/DL
HGB BLD-MCNC: 18.1 G/DL (ref 12–17)
IMM GRANULOCYTES # BLD AUTO: 0.03 THOUSAND/UL (ref 0–0.2)
IMM GRANULOCYTES NFR BLD AUTO: 0 % (ref 0–2)
LDLC SERPL CALC-MCNC: 164 MG/DL (ref 0–100)
LYMPHOCYTES # BLD AUTO: 2.57 THOUSANDS/ΜL (ref 0.6–4.47)
LYMPHOCYTES NFR BLD AUTO: 37 % (ref 14–44)
MCH RBC QN AUTO: 29.3 PG (ref 26.8–34.3)
MCHC RBC AUTO-ENTMCNC: 32.8 G/DL (ref 31.4–37.4)
MCV RBC AUTO: 89 FL (ref 82–98)
MICROALBUMIN UR-MCNC: 10.6 MG/L
MICROALBUMIN/CREAT 24H UR: 23 MG/G CREATININE (ref 0–30)
MONOCYTES # BLD AUTO: 0.39 THOUSAND/ΜL (ref 0.17–1.22)
MONOCYTES NFR BLD AUTO: 6 % (ref 4–12)
NEUTROPHILS # BLD AUTO: 3.85 THOUSANDS/ΜL (ref 1.85–7.62)
NEUTS SEG NFR BLD AUTO: 55 % (ref 43–75)
NRBC BLD AUTO-RTO: 0 /100 WBCS
PLATELET # BLD AUTO: 234 THOUSANDS/UL (ref 149–390)
PMV BLD AUTO: 12 FL (ref 8.9–12.7)
POTASSIUM SERPL-SCNC: 4.2 MMOL/L (ref 3.5–5.3)
PROT SERPL-MCNC: 7.8 G/DL (ref 6.4–8.4)
RBC # BLD AUTO: 6.18 MILLION/UL (ref 3.88–5.62)
SODIUM SERPL-SCNC: 134 MMOL/L (ref 135–147)
TRIGL SERPL-MCNC: 221 MG/DL
TSH SERPL DL<=0.05 MIU/L-ACNC: 1.39 UIU/ML (ref 0.45–4.5)
WBC # BLD AUTO: 6.95 THOUSAND/UL (ref 4.31–10.16)

## 2024-09-24 PROCEDURE — 86803 HEPATITIS C AB TEST: CPT

## 2024-09-24 PROCEDURE — 82306 VITAMIN D 25 HYDROXY: CPT

## 2024-09-24 PROCEDURE — 83036 HEMOGLOBIN GLYCOSYLATED A1C: CPT

## 2024-09-24 PROCEDURE — 84443 ASSAY THYROID STIM HORMONE: CPT

## 2024-09-24 PROCEDURE — 82570 ASSAY OF URINE CREATININE: CPT

## 2024-09-24 PROCEDURE — 80061 LIPID PANEL: CPT

## 2024-09-24 PROCEDURE — 99204 OFFICE O/P NEW MOD 45 MIN: CPT | Performed by: STUDENT IN AN ORGANIZED HEALTH CARE EDUCATION/TRAINING PROGRAM

## 2024-09-24 PROCEDURE — 82043 UR ALBUMIN QUANTITATIVE: CPT

## 2024-09-24 PROCEDURE — 80053 COMPREHEN METABOLIC PANEL: CPT

## 2024-09-24 PROCEDURE — 85025 COMPLETE CBC W/AUTO DIFF WBC: CPT

## 2024-09-24 PROCEDURE — 36415 COLL VENOUS BLD VENIPUNCTURE: CPT

## 2024-09-24 NOTE — ASSESSMENT & PLAN NOTE
Lab Results   Component Value Date    HGBA1C 12.1 (A) 04/11/2024       Orders:    POCT hemoglobin A1c    Ambulatory referral to Diabetic Education - use to refer for diabetes group classes, individual diabetes education, medical nutrition therapy, device training; Future    Lipid Panel with Direct LDL reflex; Future    Hemoglobin A1C; Future    Comprehensive metabolic panel; Future    IRIS Diabetic eye exam    TSH, 3rd generation with Free T4 reflex; Future    Vitamin D 25 hydroxy; Future    metFORMIN (GLUCOPHAGE) 1000 MG tablet; Take 1 tablet (1,000 mg total) by mouth 2 (two) times a day with meals    Albumin / creatinine urine ratio; Future    Poorly controlled diabetic with glucose levels around 300.  Explained to patient importance of starting insulin given glucose levels.  Explained to patient multiple risks of glucose being this high including death, patient expressed understanding however still refused to be on insulin.  Patient is interested in GLP-1 agonist as he believes decreasing his appetite will help controlled his A1c.  Blood work ordered as indicated above and increased patient's metformin from 750 mg daily to 1000 mg twice daily.  Patient was not taking his previously prescribed glipizide medication.  Will send an order for a GLP-1 agonist pending blood work.  Explained risks of medication including pancreatitis.  Patient does have supplies to measure his blood sugar.  Advised patient to measure his blood sugar at least daily for the next 2 weeks and bring log in 2 weeks at follow-up.  Patient expressed understanding.

## 2024-09-24 NOTE — ASSESSMENT & PLAN NOTE
Orders:    Lipid Panel with Direct LDL reflex; Future    Comprehensive metabolic panel; Future    TSH, 3rd generation with Free T4 reflex; Future    Vitamin D 25 hydroxy; Future

## 2024-09-24 NOTE — PROGRESS NOTES
Ambulatory Visit  Name: Nabeel Parra      : 1990      MRN: [id number]  Encounter Provider: Som Miller MD  Encounter Date: 2024   Encounter department: [facility]    Assessment & Plan  Poorly controlled type 2 diabetes mellitus (HCC)    Lab Results   Component Value Date    HGBA1C 12.1 (A) 2024       Orders:    POCT hemoglobin A1c    Ambulatory referral to Diabetic Education - use to refer for diabetes group classes, individual diabetes education, medical nutrition therapy, device training; Future    Lipid Panel with Direct LDL reflex; Future    Hemoglobin A1C; Future    Comprehensive metabolic panel; Future    IRIS Diabetic eye exam    TSH, 3rd generation with Free T4 reflex; Future    Vitamin D 25 hydroxy; Future    metFORMIN (GLUCOPHAGE) 1000 MG tablet; Take 1 tablet (1,000 mg total) by mouth 2 (two) times a day with meals    Albumin / creatinine urine ratio; Future    Poorly controlled diabetic with glucose levels around 300.  Explained to patient importance of starting insulin given glucose levels.  Explained to patient multiple risks of glucose being this high including death, patient expressed understanding however still refused to be on insulin.  Patient is interested in GLP-1 agonist as he believes decreasing his appetite will help controlled his A1c.  Blood work ordered as indicated above and increased patient's metformin from 750 mg daily to 1000 mg twice daily.  Patient was not taking his previously prescribed glipizide medication.  Will send an order for a GLP-1 agonist pending blood work.  Explained risks of medication including pancreatitis.  Patient does have supplies to measure his blood sugar.  Advised patient to measure his blood sugar at least daily for the next 2 weeks and bring log in 2 weeks at follow-up.  Patient expressed understanding.    Class 1 obesity with serious comorbidity and body mass index (BMI) of 34.0 to 34.9 in adult,  "unspecified obesity type    Orders:    Lipid Panel with Direct LDL reflex; Future    Comprehensive metabolic panel; Future    TSH, 3rd generation with Free T4 reflex; Future    Vitamin D 25 hydroxy; Future    Encounter to establish care    Orders:    CBC and differential; Future    Comprehensive metabolic panel; Future    Need for hepatitis C screening test    Orders:    Hepatitis C Antibody; Future       History of Present Illness     HPI  Patient presenting to establish care and discuss diabetes management.  Patient reports that his glucose levels have been around 300 however he does check his sugars infrequently.  Patient does have intermittent urinary frequency however denies other diabetic symptoms.      Review of Systems   Constitutional:  Negative for chills and fever.   HENT:  Negative for sore throat.    Respiratory:  Negative for cough and shortness of breath.    Cardiovascular:  Negative for chest pain and palpitations.   Gastrointestinal:  Negative for abdominal pain, constipation, diarrhea, nausea and vomiting.   Genitourinary:  Negative for difficulty urinating and dysuria.   Neurological:  Negative for dizziness and headaches.           Objective     /82 (BP Location: Left arm, Patient Position: Sitting, Cuff Size: Large)   Pulse 70   Temp 97.8 °F (36.6 °C) (Temporal)   Ht 5' 9\" (1.753 m)   Wt 105 kg (232 lb)   SpO2 98%   BMI 34.26 kg/m²     Physical Exam  Constitutional:       Appearance: Normal appearance.   HENT:      Head: Normocephalic and atraumatic.   Cardiovascular:      Rate and Rhythm: Normal rate and regular rhythm.      Pulses: no weak pulses.           Dorsalis pedis pulses are 1+ on the right side and 1+ on the left side.        Posterior tibial pulses are 1+ on the right side and 1+ on the left side.      Heart sounds: Normal heart sounds. No murmur heard.  Pulmonary:      Breath sounds: Normal breath sounds. No wheezing.   Abdominal:      Palpations: Abdomen is soft.      " Tenderness: There is no abdominal tenderness. There is no guarding or rebound.   Musculoskeletal:      Right lower leg: No edema.      Left lower leg: No edema.        Feet:    Feet:      Right foot:      Skin integrity: No ulcer, skin breakdown, erythema, warmth, callus or dry skin.      Left foot:      Skin integrity: No ulcer, skin breakdown, erythema, warmth, callus or dry skin.   Neurological:      Mental Status: He is alert and oriented to person, place, and time.   Psychiatric:         Mood and Affect: Mood normal.         Behavior: Behavior normal.           Diabetic Foot Exam    Patient's shoes and socks removed.    Right Foot/Ankle   Right Foot Inspection  Skin Exam: skin normal and skin intact. No dry skin, no warmth, no callus, no erythema, no maceration, no abnormal color, no pre-ulcer, no ulcer and no callus.     Toe Exam: ROM and strength within normal limits.     Sensory   Monofilament testing: intact    Vascular  The right DP pulse is 1+. The right PT pulse is 1+.     Left Foot/Ankle  Left Foot Inspection  Skin Exam: skin normal and skin intact. No dry skin, no warmth, no erythema, no maceration, normal color, no pre-ulcer, no ulcer and no callus.     Toe Exam: ROM and strength within normal limits.     Sensory   Monofilament testing: intact    Vascular  The left DP pulse is 1+. The left PT pulse is 1+.     Assign Risk Category  No deformity present  No loss of protective sensation  No weak pulses  Risk: 0

## 2024-09-25 ENCOUNTER — TELEPHONE (OUTPATIENT)
Age: 34
End: 2024-09-25

## 2024-09-25 DIAGNOSIS — E78.2 MIXED HYPERLIPIDEMIA: Primary | ICD-10-CM

## 2024-09-25 DIAGNOSIS — E11.65 TYPE 2 DIABETES MELLITUS WITH HYPERGLYCEMIA, WITHOUT LONG-TERM CURRENT USE OF INSULIN (HCC): Primary | ICD-10-CM

## 2024-09-25 DIAGNOSIS — E66.9 CLASS 1 OBESITY WITH SERIOUS COMORBIDITY AND BODY MASS INDEX (BMI) OF 34.0 TO 34.9 IN ADULT, UNSPECIFIED OBESITY TYPE: ICD-10-CM

## 2024-09-25 DIAGNOSIS — E66.811 CLASS 1 OBESITY WITH SERIOUS COMORBIDITY AND BODY MASS INDEX (BMI) OF 34.0 TO 34.9 IN ADULT, UNSPECIFIED OBESITY TYPE: ICD-10-CM

## 2024-09-25 PROBLEM — Z91.199 MEDICALLY NONCOMPLIANT: Status: ACTIVE | Noted: 2024-09-25

## 2024-09-25 RX ORDER — ATORVASTATIN CALCIUM 40 MG/1
40 TABLET, FILM COATED ORAL DAILY
Qty: 30 TABLET | Refills: 0 | Status: SHIPPED | OUTPATIENT
Start: 2024-09-25

## 2024-09-25 NOTE — TELEPHONE ENCOUNTER
PA for Ozempic 0.25 or 0.5MG/DOSE 2mg/3mL SUBMITTED     via    []CMM-KEY:   [x]Surescripts-Case ID # [id number]   []Faxed to plan   []Other website   []Phone call Case ID #     Office notes sent, clinical questions answered. Awaiting determination    Turnaround time for your insurance to make a decision on your Prior Authorization can take 7-21 business days.

## 2024-09-26 NOTE — TELEPHONE ENCOUNTER
PA for Ozempic 0.25 or 0.5MG/DOSE 2mg/3mL APPROVED     Date(s) approved: 09/25/2024 - 09/25/2025    Case #[id number]     Patient advised by          [x]Showcase-[employer] Message-No communication consent on file  []Phone call   []LMOM  []L/M to call office as no active Communication consent on file  []Unable to leave detailed message as VM not approved on Communication consent       Pharmacy advised by    [x]Fax  []Phone call    Approval letter scanned into Media Yes

## 2024-11-07 ENCOUNTER — TELEPHONE (OUTPATIENT)
Age: 34
End: 2024-11-07

## 2024-11-07 DIAGNOSIS — E11.65 POORLY CONTROLLED TYPE 2 DIABETES MELLITUS (HCC): ICD-10-CM

## 2024-11-07 DIAGNOSIS — E11.65 TYPE 2 DIABETES MELLITUS WITH HYPERGLYCEMIA, WITHOUT LONG-TERM CURRENT USE OF INSULIN (HCC): ICD-10-CM

## 2024-11-18 ENCOUNTER — OFFICE VISIT (OUTPATIENT)
Age: 34
End: 2024-11-18
Payer: COMMERCIAL

## 2024-11-18 VITALS
BODY MASS INDEX: 32.38 KG/M2 | WEIGHT: 226.2 LBS | HEIGHT: 70 IN | OXYGEN SATURATION: 99 % | DIASTOLIC BLOOD PRESSURE: 76 MMHG | TEMPERATURE: 97.2 F | SYSTOLIC BLOOD PRESSURE: 126 MMHG | RESPIRATION RATE: 16 BRPM | HEART RATE: 70 BPM

## 2024-11-18 DIAGNOSIS — E66.811 CLASS 1 OBESITY WITH SERIOUS COMORBIDITY AND BODY MASS INDEX (BMI) OF 32.0 TO 32.9 IN ADULT, UNSPECIFIED OBESITY TYPE: ICD-10-CM

## 2024-11-18 DIAGNOSIS — E78.2 MIXED HYPERLIPIDEMIA: ICD-10-CM

## 2024-11-18 DIAGNOSIS — E11.65 TYPE 2 DIABETES MELLITUS WITH HYPERGLYCEMIA, WITHOUT LONG-TERM CURRENT USE OF INSULIN (HCC): Primary | ICD-10-CM

## 2024-11-18 LAB
LEFT EYE DIABETIC RETINOPATHY: NORMAL
LEFT EYE IMAGE QUALITY: NORMAL
LEFT EYE MACULAR EDEMA: NORMAL
LEFT EYE OTHER RETINOPATHY: NORMAL
RIGHT EYE DIABETIC RETINOPATHY: NORMAL
RIGHT EYE IMAGE QUALITY: NORMAL
RIGHT EYE MACULAR EDEMA: NORMAL
RIGHT EYE OTHER RETINOPATHY: NORMAL
SEVERITY (EYE EXAM): NORMAL

## 2024-11-18 PROCEDURE — 99214 OFFICE O/P EST MOD 30 MIN: CPT | Performed by: STUDENT IN AN ORGANIZED HEALTH CARE EDUCATION/TRAINING PROGRAM

## 2024-11-18 PROCEDURE — 92250 FUNDUS PHOTOGRAPHY W/I&R: CPT | Performed by: STUDENT IN AN ORGANIZED HEALTH CARE EDUCATION/TRAINING PROGRAM

## 2024-11-18 RX ORDER — ATORVASTATIN CALCIUM 40 MG/1
40 TABLET, FILM COATED ORAL DAILY
Qty: 90 TABLET | Refills: 0 | Status: SHIPPED | OUTPATIENT
Start: 2024-11-18

## 2024-11-18 NOTE — ASSESSMENT & PLAN NOTE
Lab Results   Component Value Date    HGBA1C 12.8 (H) 09/24/2024       Orders:    Hemoglobin A1C; Future    IRIS Diabetic eye exam    metFORMIN (GLUCOPHAGE) 1000 MG tablet; Take 1 tablet (1,000 mg total) by mouth 2 (two) times a day with meals  Given significant improvement in patient's glucose levels, advised patient to continue metformin 1000 mg twice daily at this time.  Patient was not interested in being prescribed an alternative to Ozempic as he is highly motivated with his dietary changes and is looking to start regular exercise.  Advised patient to continue to monitor his glucose levels and to follow-up with office if they were to elevate again.  Patient expressed understanding.  Repeat A1c and lipid panel ordered for 3 months from last levels, will follow-up results with patient.

## 2024-11-18 NOTE — PROGRESS NOTES
Name: Nabeel Parra      : 1990      MRN: [id number]  Encounter Provider: Som Miller MD  Encounter Date: 2024   Encounter department: [facility]  :  Assessment & Plan  Type 2 diabetes mellitus with hyperglycemia, without long-term current use of insulin ([facility])    Lab Results   Component Value Date    HGBA1C 12.8 (H) 2024       Orders:    Hemoglobin A1C; Future    IRIS Diabetic eye exam    metFORMIN (GLUCOPHAGE) 1000 MG tablet; Take 1 tablet (1,000 mg total) by mouth 2 (two) times a day with meals  Given significant improvement in patient's glucose levels, advised patient to continue metformin 1000 mg twice daily at this time.  Patient was not interested in being prescribed an alternative to Ozempic as he is highly motivated with his dietary changes and is looking to start regular exercise.  Advised patient to continue to monitor his glucose levels and to follow-up with office if they were to elevate again.  Patient expressed understanding.  Repeat A1c and lipid panel ordered for 3 months from last levels, will follow-up results with patient.  Mixed hyperlipidemia    Orders:    atorvastatin (LIPITOR) 40 mg tablet; Take 1 tablet (40 mg total) by mouth daily    Lipid Panel With Direct LDL; Future    Class 1 obesity with serious comorbidity and body mass index (BMI) of 32.0 to 32.9 in adult, unspecified obesity type         Encouraged patient on weight loss and lifestyle adjustments, will continue to monitor.         History of Present Illness     HPI  Patient presenting today to follow-up poorly controlled type 2 diabetes.  Patient reports that he was unable to get Ozempic covered by insurance and was too expensive for him to start.  Patient however has been strictly watching his diet since the prior visit.  Patient reports that his glucose has gradually gone down and over the past 2 weeks he has not obtained any readings that are over 200.  Patient also endorses  "weight loss given healthy diet.  Patient has been feeling better after modifying his diet.  Patient does get exercise at work however does not formally exercise.  Patient otherwise doing well today without any acute complaints.    Review of Systems   Constitutional:  Negative for chills and fever.   HENT:  Negative for sore throat.    Respiratory:  Negative for cough and shortness of breath.    Cardiovascular:  Negative for chest pain and palpitations.   Gastrointestinal:  Negative for abdominal pain, constipation, diarrhea, nausea and vomiting.   Genitourinary:  Negative for difficulty urinating and dysuria.   Neurological:  Negative for dizziness and headaches.          Objective   /76 (BP Location: Left arm, Patient Position: Sitting, Cuff Size: Large)   Pulse 70   Temp (!) 97.2 °F (36.2 °C) (Temporal)   Resp 16   Ht 5' 10\" (1.778 m)   Wt 103 kg (226 lb 3.2 oz)   SpO2 99%   BMI 32.46 kg/m²      Physical Exam  Constitutional:       Appearance: Normal appearance. He is obese.   HENT:      Head: Normocephalic and atraumatic.   Cardiovascular:      Rate and Rhythm: Normal rate and regular rhythm.      Heart sounds: Normal heart sounds. No murmur heard.  Pulmonary:      Breath sounds: Normal breath sounds. No wheezing.   Abdominal:      Palpations: Abdomen is soft.      Tenderness: There is no abdominal tenderness. There is no guarding or rebound.   Musculoskeletal:      Right lower leg: No edema.      Left lower leg: No edema.   Neurological:      Mental Status: He is alert and oriented to person, place, and time.   Psychiatric:         Mood and Affect: Mood normal.         Behavior: Behavior normal.         "

## 2024-11-18 NOTE — ASSESSMENT & PLAN NOTE
Orders:    atorvastatin (LIPITOR) 40 mg tablet; Take 1 tablet (40 mg total) by mouth daily    Lipid Panel With Direct LDL; Future

## 2025-05-24 DIAGNOSIS — E11.65 TYPE 2 DIABETES MELLITUS WITH HYPERGLYCEMIA, WITHOUT LONG-TERM CURRENT USE OF INSULIN (HCC): ICD-10-CM

## 2025-05-27 ENCOUNTER — OFFICE VISIT (OUTPATIENT)
Dept: FAMILY MEDICINE CLINIC | Facility: CLINIC | Age: 35
End: 2025-05-27
Payer: COMMERCIAL

## 2025-05-27 VITALS
TEMPERATURE: 98.2 F | HEIGHT: 70 IN | RESPIRATION RATE: 16 BRPM | DIASTOLIC BLOOD PRESSURE: 100 MMHG | WEIGHT: 233 LBS | BODY MASS INDEX: 33.36 KG/M2 | SYSTOLIC BLOOD PRESSURE: 130 MMHG | HEART RATE: 84 BPM | OXYGEN SATURATION: 98 %

## 2025-05-27 DIAGNOSIS — E11.65 TYPE 2 DIABETES MELLITUS WITH HYPERGLYCEMIA, WITHOUT LONG-TERM CURRENT USE OF INSULIN (HCC): ICD-10-CM

## 2025-05-27 DIAGNOSIS — E78.2 MIXED HYPERLIPIDEMIA: ICD-10-CM

## 2025-05-27 DIAGNOSIS — N48.1 BALANITIS: ICD-10-CM

## 2025-05-27 DIAGNOSIS — Z76.89 ENCOUNTER TO ESTABLISH CARE: Primary | ICD-10-CM

## 2025-05-27 LAB
CREAT UR-MCNC: 73.4 MG/DL
LEFT EYE DIABETIC RETINOPATHY: NORMAL
LEFT EYE IMAGE QUALITY: NORMAL
LEFT EYE MACULAR EDEMA: NORMAL
LEFT EYE OTHER RETINOPATHY: NORMAL
MICROALBUMIN UR-MCNC: 16 MG/L
MICROALBUMIN/CREAT 24H UR: 22 MG/G CREATININE (ref 0–30)
RIGHT EYE DIABETIC RETINOPATHY: NORMAL
RIGHT EYE IMAGE QUALITY: NORMAL
RIGHT EYE MACULAR EDEMA: NORMAL
RIGHT EYE OTHER RETINOPATHY: NORMAL
SEVERITY (EYE EXAM): NORMAL
SL AMB POCT HEMOGLOBIN AIC: 11.8 (ref ?–6.5)

## 2025-05-27 PROCEDURE — 82570 ASSAY OF URINE CREATININE: CPT

## 2025-05-27 PROCEDURE — 92250 FUNDUS PHOTOGRAPHY W/I&R: CPT

## 2025-05-27 PROCEDURE — 99204 OFFICE O/P NEW MOD 45 MIN: CPT

## 2025-05-27 PROCEDURE — 83036 HEMOGLOBIN GLYCOSYLATED A1C: CPT

## 2025-05-27 PROCEDURE — 82043 UR ALBUMIN QUANTITATIVE: CPT

## 2025-05-27 RX ORDER — ATORVASTATIN CALCIUM 40 MG/1
40 TABLET, FILM COATED ORAL DAILY
Qty: 30 TABLET | Refills: 2 | Status: SHIPPED | OUTPATIENT
Start: 2025-05-27 | End: 2025-08-25

## 2025-05-27 RX ORDER — CLOTRIMAZOLE 1 %
CREAM (GRAM) TOPICAL 2 TIMES DAILY
Qty: 85 G | Refills: 0 | Status: SHIPPED | OUTPATIENT
Start: 2025-05-27 | End: 2025-06-26

## 2025-05-27 NOTE — PATIENT INSTRUCTIONS
"Patient Education     Balanitis en adultos   Conceptos Básicos   Redactado por los médicos y editores de [employer]   ¿Qué es la balanitis? -- Balanitis es el término médico para cuando la rolo del pene o el clítoris está hinchado, dolorido o inflamado.  Mckenzie padecimiento afecta con mayor frecuencia al pene. Mckenzie artículo trata sobre malathi tipo.  ¿Cuáles son los síntomas de la balanitis? -- Los síntomas con frecuencia empeoran en 3 a 7 días y pueden incluir:   Dolor, sensibilidad o comezón en la rolo del pene (también llamada “glande”) (figura 1)   Llagas barnes en la rolo del pene o en la piel que la rodea (también llamada \"prepucio\")   Pus espeso y con mal olor en la punta del pene  Si la persona no está circuncidada y estos síntomas no se tratan, el pene se puede inflamar y el prepucio puede quedar pegado a la rolo del pene o blake debajo de esta. El prepucio también puede formar cicatrices.  En algunos casos, la balanitis puede hacer que sea difícil orinar.  ¿Anne consultar a un médico o enfermero? -- Sí. Consulte a rodriguez médico o enfermero de inmediato si tiene los síntomas mencionados anteriormente.  Vaya al departamento de emergencias si no está circuncidado y rodriguez prepucio está trabado blake debajo de la rolo del pene y no puede moverlo. Barnes podría causar daño permanente (figura 2).  ¿Es necesario que me realice pruebas? -- Es probable que sí. Rodriguez médico o enfermero tendrá que averiguar qué podría estar causando la balanitis. Para ello, es probable que lisette lo siguiente:   Tomará muestras del líquido que sale del pene para detectar brandie infección   Ordenará pruebas de jose para determinar si usted tiene diabetes o brandie infección  ¿Cómo se trata la balanitis? -- El tratamiento varía según la causa de la balanitis:   Si la balanitis es causada por brandie infección por Candida, recibirá tratamiento con medicinas llamadas “antimicóticas”. Estas medicinas vienen en forma de crema o gel que se coloca en el pene, o de " píldora que se thanh por la boca. Las infecciones por Candida afectan con mayor frecuencia a personas de sexo masculino con diabetes.   Si la balanitis se produjo porque usted no limpió rodriguez pene correctamente, deberá enjuagarse el pene dos veces al día. Después de que mejoren jeff síntomas, deberá comenzar a limpiarse mejor con agua y jabón suave todos los días (kt no use jabón mientras todavía tenga síntomas, ya que puede empeorarlos).   Si la balanitis se debe a brandie infección bacteriana, recibirá tratamiento con píldoras de antibiótico. Las infecciones que pueden causar balanitis incluyen las de transmisión sexual.   Si la balanitis se debe a brandie alergia o reacción de la piel a jabones, medicinas o químicos, recibirá tratamiento con cremas o ungüentos con esteroides. Estos pueden ayudar a disminuir la inflamación y a que la piel sane.   En casos aislados, la balanitis es un síntoma de cáncer o brandie indicación de que el cáncer podría desarrollarse pronto. Si la balanitis está relacionada con cáncer, recibirá tratamiento con cirugía o medicinas para el cáncer.  ¿Se puede prevenir la balanitis? -- Sí. Puede reducir las probabilidades de tener balanitis manteniendo rodriguez pene limpio. [place] es particularmente importante si no está circuncidado.  Brandie vez curados jeff síntomas:   Lávese el pene todos los días con agua y jabón.   Si no está circuncidado, empuje el prepucio hacia atrás para limpiar debajo de april. Luego, seque la piel de malathi lugar antes de volver a  el prepucio hasta rodriguez sitio.  Mantener el pene limpio es importante, kt también es posible excederse. Lave la cody suavemente con agua que no esté demasiado caliente. Use un jabón suave, kt no frote la cody con demasiada fuerza.  ¿A qué problemas ayden prestar atención? -- Si no se trata, la balanitis puede generar problemas.  Vaya al departamento de emergencias si:   Tiene el prepucio atascado blake debajo de la punta del pene y no puede moverlo.  Llame a rodriguez  médico o enfermero para solicitar asesoramiento si:   No puede empujarse el prepucio hacia atrás.   Tiene dolor al orinar o dificultad para orinar.   Tiene problemas con las relaciones sexuales.   Los síntomas no mejoran después del tratamiento.  Todos los artículos se actualizan a medida que se descubre nueva evidencia y culmina nuestro proceso de evaluación por homólogos   April artículo se recuperó de UpToDate el: Mar 06, 2024.  Artículo 70569 Versión 16.0.es-419.1  Release: 32.2.4 - C32.64  © 2024 [employer]. Todos los derechos reservados.  figura 1: Anatomía del pene     En esta imagen se muestran las diferentes partes del pene.  Gráfico 22928 Versión 2.0  figura 2: Parafimosis     A veces, el prepucio se puede quedar atascado debajo de la rolo del pene. [place] se denomina “parafimosis”. Si esto le ocurre a usted o a rodriguez hijo, no intente  la piel a la fuerza. Vaya al departamento de emergencias de un hospital para recibir tratamiento. Si el problema no se trata de inmediato, puede causar daño permanente.  Gráfico 54471 Versión 3.0  Exención de responsabilidad y uso de la información del consumidor   Descargo de responsabilidad: esta información generalizada es un resumen limitado de información sobre el diagnóstico, el tratamiento y/o los medicamentos. No pretende ser exhaustiva y se debe utilizar hailey herramienta para ayudar al usuario a comprender y/o evaluar las posibles opciones de diagnóstico y tratamiento. No incluye toda la información sobre afecciones, tratamientos, medicamentos, efectos secundarios o riesgos puedan ser aplicables a un paciente específico. No tiene el propósito de servir hailey recomendación médica ni de sustituir la recomendación médica, el diagnóstico o el tratamiento de un profesional de atención médica que se base en el examen y la evaluación de april profesional de la surinder respecto a las circunstancias específicas y únicas del paciente. Los pacientes deben hablar con un  profesional de atención médica para obtener información completa sobre rodriguez surinder, cuestiones médicas y opciones de tratamiento, incluidos los riesgos o los beneficios relacionados con el uso de medicamentos. Esta información no certifica que los tratamientos o medicamentos aye seguros, eficaces o estén aprobados para tratar a un paciente específico. [employer] y jeff afiliados renuncian a cualquier garantía o responsabilidad relacionada con esta información o el uso de la misma.El uso de esta información está sujeto a las Condiciones de uso, disponibles en https://www.[employer].com/en/know/clinical-effectiveness-terms. 2024© [employer] y jeff afiliados y/o licenciantes. Todos los derechos reservados.  Copyright   © 2024 [employer] Todos los derechos reservados.

## 2025-05-27 NOTE — ASSESSMENT & PLAN NOTE
Lab Results   Component Value Date    CHOLESTEROL 250 (H) 09/24/2024    CHOLESTEROL 186 10/28/2020     Lab Results   Component Value Date    HDL 42 09/24/2024    HDL 34 (L) 10/28/2020     Lab Results   Component Value Date    TRIG 221 (H) 09/24/2024    TRIG 133 10/28/2020     Lab Results   Component Value Date    NONHDLC 152 10/28/2020      Will restart atorvastatin 40mg daily, refill sent.  Orders:    atorvastatin (LIPITOR) 40 mg tablet; Take 1 tablet (40 mg total) by mouth daily

## 2025-05-27 NOTE — ASSESSMENT & PLAN NOTE
Lab Results   Component Value Date    HGBA1C 11.8 (A) 05/27/2025     Reviewed pathophysiology and complications of poor glucose control. Pt continues to have poorly controlled glucose due to not taking medication. Yves restart metformin 1000mg BID and recheck A1c in 3 months. Should pt continue to have poor control likely to request GLP1.  IRIS, albumin creatinine ratio, and diabetic foot exam completed today.    Orders:    POCT hemoglobin A1c    IRIS Diabetic eye exam    Albumin / creatinine urine ratio; Future    metFORMIN (GLUCOPHAGE) 1000 MG tablet; Take 1 tablet (1,000 mg total) by mouth 2 (two) times a day with meals

## 2025-05-27 NOTE — PROGRESS NOTES
Name: Nabeel Parar      : 1990      MRN: [id number]  Encounter Provider: Fernando Munoz MD  Encounter Date: 2025   Encounter department: [facility]IMARY [facility]  :  Assessment & Plan  Encounter to establish care  EMR reviewed and updated.       Type 2 diabetes mellitus with hyperglycemia, without long-term current use of insulin (HCC)    Lab Results   Component Value Date    HGBA1C 11.8 (A) 2025     Reviewed pathophysiology and complications of poor glucose control. Pt continues to have poorly controlled glucose due to not taking medication. Yves restart metformin 1000mg BID and recheck A1c in 3 months. Should pt continue to have poor control likely to request GLP1.  IRIS, albumin creatinine ratio, and diabetic foot exam completed today.    Orders:    POCT hemoglobin A1c    IRIS Diabetic eye exam    Albumin / creatinine urine ratio; Future    metFORMIN (GLUCOPHAGE) 1000 MG tablet; Take 1 tablet (1,000 mg total) by mouth 2 (two) times a day with meals    Mixed hyperlipidemia  Lab Results   Component Value Date    CHOLESTEROL 250 (H) 2024    CHOLESTEROL 186 10/28/2020     Lab Results   Component Value Date    HDL 42 2024    HDL 34 (L) 10/28/2020     Lab Results   Component Value Date    TRIG 221 (H) 2024    TRIG 133 10/28/2020     Lab Results   Component Value Date    NONHDLC 152 10/28/2020      Will restart atorvastatin 40mg daily, refill sent.  Orders:    atorvastatin (LIPITOR) 40 mg tablet; Take 1 tablet (40 mg total) by mouth daily    Balanitis  Likely fungal infection due to poorly controlled DM. Will trial clotrimazole 1% cream BID for up to 4 weeks and reviewed  hygiene. Reviewed ED precautions for paraphimosis.  Orders:    clotrimazole (LOTRIMIN) 1 % cream; Apply topically 2 (two) times a day           History of Present Illness   Nabeel Parra is a 34 y.o. male presenting to clinic to establish care.    PMH: per  "EMR  Allergy: NKDA  Surgeryhx: denying  Familyhx: mother has diabetes, father passed from leukemia, uncle had stomach cancer?  Social: works for [employer] and in [employer] lifting up to 20lbs and moving constantly, lives with his wife/mother/daughter, occasionally uses vape and hookah about every 2 weeks, drinks etoh socially, no other illicit drug use. Currently sexually active with single female partner and no hx of STD.    Having penile inflammation and unable to retract skin without discomfort. Started one month ago.     In regard to DM, pt has not taken medication in at least one month. Pt states he finds it hard to avoid overeating even with medication though he does note that his glucose levels are significantly better with metformin alone. ROS as noted below.      Review of Systems   Constitutional:  Negative for chills and fever.   Respiratory:  Negative for shortness of breath.    Cardiovascular:  Negative for chest pain.   Gastrointestinal:  Negative for abdominal pain, blood in stool, diarrhea, nausea and vomiting.   Endocrine: Positive for polyuria.   Genitourinary:  Positive for frequency. Negative for dysuria, genital sores, hematuria, penile discharge, penile pain, penile swelling, scrotal swelling and testicular pain.       Objective   /100 (BP Location: Left arm, Patient Position: Sitting, Cuff Size: Standard)   Pulse 84   Temp 98.2 °F (36.8 °C) (Temporal)   Resp 16   Ht 5' 10\" (1.778 m)   Wt 106 kg (233 lb)   SpO2 98%   BMI 33.43 kg/m²      Physical Exam  Vitals and nursing note reviewed. Chaperone present: deferred per pt request.   Constitutional:       General: He is not in acute distress.     Appearance: Normal appearance. He is not ill-appearing, toxic-appearing or diaphoretic.     Eyes:      Conjunctiva/sclera: Conjunctivae normal.       Cardiovascular:      Rate and Rhythm: Normal rate and regular rhythm.      Pulses: Normal pulses. no weak pulses.           Dorsalis pedis " pulses are 2+ on the right side and 2+ on the left side.        Posterior tibial pulses are 2+ on the right side and 2+ on the left side.      Heart sounds: Normal heart sounds.   Pulmonary:      Effort: Pulmonary effort is normal.      Breath sounds: Normal breath sounds.   Abdominal:      Hernia: There is no hernia in the left inguinal area or right inguinal area.   Genitourinary:     Pubic Area: No rash or pubic lice.       Penis: Uncircumcised. Phimosis, erythema, tenderness and swelling present. No paraphimosis, hypospadias, discharge or lesions.       Testes: Normal.      Epididymis:      Right: Normal.      Left: Normal.   Feet:      Right foot:      Skin integrity: No ulcer, skin breakdown, erythema, warmth, callus or dry skin.      Left foot:      Skin integrity: No ulcer, skin breakdown, erythema, warmth, callus or dry skin.   Lymphadenopathy:      Lower Body: No right inguinal adenopathy. No left inguinal adenopathy.     Skin:     General: Skin is warm and dry.     Neurological:      General: No focal deficit present.      Mental Status: He is alert.     Psychiatric:         Mood and Affect: Mood normal.         Behavior: Behavior normal.       Patient's shoes and socks removed.    Right Foot/Ankle   Right Foot Inspection  Skin Exam: skin normal and skin intact. No dry skin, no warmth, no callus, no erythema, no maceration, no abnormal color, no pre-ulcer, no ulcer and no callus.     Toe Exam: ROM and strength within normal limits.     Sensory   Monofilament testing: intact    Vascular  Capillary refills: < 3 seconds  The right DP pulse is 2+. The right PT pulse is 2+.     Left Foot/Ankle  Left Foot Inspection  Skin Exam: skin normal and skin intact. No dry skin, no warmth, no erythema, no maceration, normal color, no pre-ulcer, no ulcer and no callus.     Toe Exam: ROM and strength within normal limits.     Sensory   Monofilament testing: intact    Vascular  Capillary refills: < 3 seconds  The left DP  pulse is 2+. The left PT pulse is 2+.     Assign Risk Category  No deformity present  No loss of protective sensation  No weak pulses  Risk: 0

## 2025-05-28 ENCOUNTER — RESULTS FOLLOW-UP (OUTPATIENT)
Dept: FAMILY MEDICINE CLINIC | Facility: CLINIC | Age: 35
End: 2025-05-28

## 2025-06-19 DIAGNOSIS — E11.65 TYPE 2 DIABETES MELLITUS WITH HYPERGLYCEMIA, WITHOUT LONG-TERM CURRENT USE OF INSULIN (HCC): ICD-10-CM

## 2025-07-08 ENCOUNTER — APPOINTMENT (OUTPATIENT)
Dept: LAB | Facility: HOSPITAL | Age: 35
End: 2025-07-08
Payer: COMMERCIAL

## 2025-07-08 ENCOUNTER — OFFICE VISIT (OUTPATIENT)
Dept: FAMILY MEDICINE CLINIC | Facility: CLINIC | Age: 35
End: 2025-07-08
Payer: COMMERCIAL

## 2025-07-08 VITALS
TEMPERATURE: 95.7 F | WEIGHT: 229.4 LBS | HEIGHT: 70 IN | OXYGEN SATURATION: 97 % | HEART RATE: 60 BPM | DIASTOLIC BLOOD PRESSURE: 62 MMHG | RESPIRATION RATE: 18 BRPM | SYSTOLIC BLOOD PRESSURE: 118 MMHG | BODY MASS INDEX: 32.84 KG/M2

## 2025-07-08 DIAGNOSIS — E78.2 MIXED HYPERLIPIDEMIA: ICD-10-CM

## 2025-07-08 DIAGNOSIS — Z00.00 ANNUAL PHYSICAL EXAM: ICD-10-CM

## 2025-07-08 DIAGNOSIS — Z00.00 ANNUAL PHYSICAL EXAM: Primary | ICD-10-CM

## 2025-07-08 DIAGNOSIS — Z23 ENCOUNTER FOR IMMUNIZATION: ICD-10-CM

## 2025-07-08 LAB
ALBUMIN SERPL BCG-MCNC: 4.4 G/DL (ref 3.5–5)
ALP SERPL-CCNC: 92 U/L (ref 34–104)
ALT SERPL W P-5'-P-CCNC: 38 U/L (ref 7–52)
ANION GAP SERPL CALCULATED.3IONS-SCNC: 10 MMOL/L (ref 4–13)
AST SERPL W P-5'-P-CCNC: 22 U/L (ref 13–39)
BILIRUB SERPL-MCNC: 0.4 MG/DL (ref 0.2–1)
BUN SERPL-MCNC: 9 MG/DL (ref 5–25)
CALCIUM SERPL-MCNC: 10.4 MG/DL (ref 8.4–10.2)
CHLORIDE SERPL-SCNC: 98 MMOL/L (ref 96–108)
CHOLEST SERPL-MCNC: 250 MG/DL (ref ?–200)
CO2 SERPL-SCNC: 26 MMOL/L (ref 21–32)
CREAT SERPL-MCNC: 0.85 MG/DL (ref 0.6–1.3)
GFR SERPL CREATININE-BSD FRML MDRD: 112 ML/MIN/1.73SQ M
GLUCOSE P FAST SERPL-MCNC: 406 MG/DL (ref 65–99)
HDLC SERPL-MCNC: 40 MG/DL
LDLC SERPL CALC-MCNC: 166 MG/DL (ref 0–100)
POTASSIUM SERPL-SCNC: 4.2 MMOL/L (ref 3.5–5.3)
PROT SERPL-MCNC: 7.9 G/DL (ref 6.4–8.4)
SODIUM SERPL-SCNC: 134 MMOL/L (ref 135–147)
TRIGL SERPL-MCNC: 218 MG/DL (ref ?–150)

## 2025-07-08 PROCEDURE — 80053 COMPREHEN METABOLIC PANEL: CPT

## 2025-07-08 PROCEDURE — 90471 IMMUNIZATION ADMIN: CPT

## 2025-07-08 PROCEDURE — 90677 PCV20 VACCINE IM: CPT

## 2025-07-08 PROCEDURE — 90472 IMMUNIZATION ADMIN EACH ADD: CPT

## 2025-07-08 PROCEDURE — 36415 COLL VENOUS BLD VENIPUNCTURE: CPT

## 2025-07-08 PROCEDURE — 90715 TDAP VACCINE 7 YRS/> IM: CPT

## 2025-07-08 PROCEDURE — 99395 PREV VISIT EST AGE 18-39: CPT

## 2025-07-08 PROCEDURE — 80061 LIPID PANEL: CPT

## 2025-07-08 RX ORDER — ATORVASTATIN CALCIUM 40 MG/1
40 TABLET, FILM COATED ORAL DAILY
Qty: 30 TABLET | Refills: 5 | Status: SHIPPED | OUTPATIENT
Start: 2025-07-08 | End: 2026-01-04

## 2025-07-08 NOTE — PROGRESS NOTES
Adult Annual Physical  Name: Nabeel Parra      : 1990      MRN: [id number]  Encounter Provider: Fernando Munoz MD  Encounter Date: 2025   Encounter department: [facility]IMARY [facility]    :  Assessment & Plan  Annual physical exam  VS and physical examination wnl. Age appropriate screenings and vaccinations reviewed and ordered as noted below. Healthy diet and exercise counseling provided.    Orders:  •  Lipid Panel with Direct LDL reflex; Future  •  Comprehensive metabolic panel; Future    Mixed hyperlipidemia  Has not started statin therapy as this was not given to him at the pharmacy. Have sent 6 month supply today.  Orders:  •  Lipid Panel with Direct LDL reflex; Future  •  atorvastatin (LIPITOR) 40 mg tablet; Take 1 tablet (40 mg total) by mouth daily    Encounter for immunization    Orders:  •  TDAP VACCINE GREATER THAN OR EQUAL TO 8YO IM  •  Pneumococcal Conjugate Vaccine 20-valent (Pcv20)        Preventive Screenings:  - Diabetes Screening: has diabetes  - Cholesterol Screening: has hyperlipidemia   - Hepatitis C screening: screening up-to-date   - HIV screening: screening up-to-date   - Colon cancer screening: screening not indicated   - Lung cancer screening: screening not indicated   - Prostate cancer screening: screening not indicated     Immunizations:    - Risks/benefits immunizations discussed    - Immunizations given per orders      Counseling/Anticipatory Guidance:    - Diet: discussed recommendations for a healthy/well-balanced diet.   - Exercise: the importance of regular exercise/physical activity was discussed. Recommend exercise 3-5 times per week for at least 30 minutes.          History of Present Illness     Adult Annual Physical:  Patient presents for annual physical.     Diet and Physical Activity:  - Diet/Nutrition: portion control.  - Exercise: no formal exercise.    Depression Screening:  - PHQ-2 Score: 0    General Health:  -  "Sleep: 4-6 hours of sleep on average.  - Hearing: normal hearing bilateral ears.  - Vision: most recent eye exam > 1 year ago and wears glasses.  - Dental: no dental visits for > 1 year, floss regularly and brushes teeth once daily.     Health:  - History of STDs: no.   - Urinary symptoms: none.     Review of Systems   Constitutional:  Negative for chills and fever.   Respiratory:  Negative for shortness of breath.    Cardiovascular:  Negative for chest pain.   Gastrointestinal:  Negative for abdominal pain, blood in stool, diarrhea, nausea and vomiting.   Genitourinary:  Negative for dysuria and hematuria.         Objective   /62 (BP Location: Left arm, Patient Position: Sitting, Cuff Size: Standard)   Pulse 60   Temp (!) 95.7 °F (35.4 °C) (Tympanic)   Resp 18   Ht 5' 10\" (1.778 m)   Wt 104 kg (229 lb 6.4 oz)   SpO2 97%   BMI 32.92 kg/m²     Physical Exam  Vitals and nursing note reviewed.   Constitutional:       Appearance: Normal appearance.   HENT:      Head: Normocephalic.      Right Ear: Tympanic membrane, ear canal and external ear normal.      Left Ear: Tympanic membrane, ear canal and external ear normal.      Nose: Nose normal.      Mouth/Throat:      Mouth: Mucous membranes are moist.      Pharynx: Oropharynx is clear.     Eyes:      Conjunctiva/sclera: Conjunctivae normal.       Cardiovascular:      Rate and Rhythm: Normal rate and regular rhythm.      Pulses: Normal pulses.      Heart sounds: Normal heart sounds.   Pulmonary:      Effort: Pulmonary effort is normal.      Breath sounds: Normal breath sounds.   Abdominal:      General: Abdomen is flat. There is no distension.      Palpations: Abdomen is soft. There is no mass.      Tenderness: There is no abdominal tenderness. There is no right CVA tenderness, left CVA tenderness, guarding or rebound.      Hernia: No hernia is present.     Musculoskeletal:         General: Normal range of motion.      Cervical back: Normal range of motion " and neck supple.   Lymphadenopathy:      Head:      Right side of head: No submental, submandibular, tonsillar, preauricular, posterior auricular or occipital adenopathy.      Left side of head: No submental, submandibular, tonsillar, preauricular, posterior auricular or occipital adenopathy.      Cervical: No cervical adenopathy.      Right cervical: No superficial, deep or posterior cervical adenopathy.     Left cervical: No superficial, deep or posterior cervical adenopathy.      Upper Body:      Right upper body: No supraclavicular adenopathy.      Left upper body: No supraclavicular adenopathy.     Skin:     General: Skin is warm and dry.     Neurological:      General: No focal deficit present.      Mental Status: He is alert.     Psychiatric:         Mood and Affect: Mood normal.         Behavior: Behavior normal.

## 2025-07-08 NOTE — ASSESSMENT & PLAN NOTE
Has not started statin therapy as this was not given to him at the pharmacy. Have sent 6 month supply today.  Orders:    Lipid Panel with Direct LDL reflex; Future    atorvastatin (LIPITOR) 40 mg tablet; Take 1 tablet (40 mg total) by mouth daily

## 2025-07-08 NOTE — PATIENT INSTRUCTIONS
Patient Education     Dieta mediterránea   Conceptos Básicos   Redactado por los médicos y editores de UpToDate   ¿Qué es brandie dieta mediterránea? -- Brandie dieta mediterránea es brandie forma de comer saludable para el corazón. Incluye alimentos y estilos culinarios de muchos países que están alrededor john Mediterráneo tales hailey [place] e [place]. Los alimentos exactos incluidos varían de un lugar a otro.  Brandie dieta mediterránea consiste en consumir muchas frutas, verduras, josefina secos y cereales integrales. Se utiliza aceite de ruggiero en lugar de otras grasas. La dieta también contiene algo de pescado, aves y productos lácteos, kt no brandie gran cantidad de humaira barnes.  El vino suele considerarse parte de la dieta mediterránea. No es necesario y puede optar por no incluirlo. Si consume alcohol, limite la cantidad a:   Si es brandie persona de sexo femenino, no marichuy más de brandie copa por día   Si es brandie persona de sexo masculino, no marichuy más de dos copas por día  ¿Cuáles son los beneficios de brandie dieta mediterránea? -- Brandie dieta mediterránea puede ayudarlo a:   Mejorar rodriguez surinder general y a perder peso   Reducir el riesgo de sufrir un accidente cerebrovascular (derrame)   Reducir el riesgo de sufrir problemas cardíacos, hailey un infarto   Controlar rodriguez nivel de azúcar en jose si tiene diabetes  ¿Qué puedo comer y beber con brandie dieta mediterránea? -- La dieta mediterránea es más un patrón alimentario que brandie dieta estricta. Intente cubrir dos tercios de rodriguez plato con frutas y verduras frescas. Algunos ejemplos de alimentos que suelen formar parte de april patrón son:   Granos - Cereales integrales tales hailey el pan y la pasta integrales, romelia, cuscús, arroz integral, cebada y orzo.   Frutas - Muchos tipos y colores de frutas frescas, congeladas, secas o enlatadas. Frutas congeladas o enlatadas con jugo 100% de fruta o agua (sin azúcar agregada). Los ejemplos incluyen manzanas, peras, bayas, melones, plátanos, ciruelas, pasas,  higos y melocotones.   Verduras - Muchos tipos y colores de verduras frescas, congeladas o enlatadas. Si están enlatadas, que aye bajas en sodio o sin sal. Si están congeladas, que aye sin grasas ni sodio añadidos. Los ejemplos incluyen aguacates, pimientos, tomates, espinacas, col rizada, frijoles, zanahorias, guisantes, aceitunas, pepinos, hummus, frijoles de soya, lentejas y frijoles rojos.   Lácteos - leche, queso y otros productos lácteos bajos en grasa. Yogur mary jane, kéfir y alternativas a la leche de origen vegetal hailey la leche de soya.   Rod magras, aves, mariscos y proteínas - salmón, atún, bacalao y otros pescados. Camarones, almejas, vieiras y mejillones. Rod felicia de ivanna y pavo, huevos, frijoles secos, lentejas y tofu. Josefina secos tales hailey nueces de nogal, almendras, pecanas, avellanas, anacardos y mantequillas de josefina secos. Semillas tales hailey semillas de calabaza, sésamo, maya y girasol.   Grasas, aceites y otros alimentos - Alimentos con grasas saludables que se encuentran en el pescado, los josefina secos y los aguacates. Aceite de ruggiero o aceites vegetales tales hailey el aceite de [place]. Utilice cebollas, ajos, especias y hierbas para condimentar los alimentos.  ¿Qué alimentos y bebidas ayden evitar o limitar con brandie dieta mediterránea? -- Brandie dieta mediterránea implica evitar o limitar ciertos tipos de alimentos. Trate de evitar los alimentos con aditivos tales hailey los edulcorantes artificiales. Evite los alimentos procesados, refinados o conservados. Suelen ser alimentos con brandie juju útil muy larga.   Cereales que se deben evitar - Pan cramer, pasta, arroz cramer, galletas de sal y galletas dulces.   Frutas que se deben evitar - Frutas enlatadas o congeladas con azúcar adicional.   Verduras que se deben evitar - Kameron y mezclas de verduras preparadas comercialmente, verduras y jugos enlatados comunes y verduras congeladas con salsa o verduras encurtidas.   Lácteos que se deben  evitar - Productos lácteos enteros tales hailey queso, helado, leche entera, crema y jameson de mantequilla.   Rod magras, aves, mariscos y proteínas que se deben evitar - Rod barnes tales hailey ternera, cerdo y marie. Rod procesadas tales hailey salchichas, fiambres, salami, hot dogs y tocino.   Grasas, aceites y otros alimentos que se deben evitar - Mantequilla, margarina, manteca de cerdo, jugos de carne, salsas y aderezos para ensaladas. Galletas, pasteles, dulces, donas, panecillos y otros dulces.  Todos los artículos se actualizan a medida que se descubre nueva evidencia y culmina nuestro proceso de evaluación por homólogos   Mckenzie artículo se recuperó de UpToDate el: Apr 17, 2024.  Artículo [id number] Versión [id number]  Release: 32.3.2 - C32.106  © 2024 Up[employer]Date, Inc. Todos los derechos reservados.  Exención de responsabilidad y uso de la información del consumidor   Descargo de responsabilidad: esta información generalizada es un resumen limitado de información sobre el diagnóstico, el tratamiento y/o los medicamentos. No pretende ser exhaustiva y se debe utilizar hailey herramienta para ayudar al usuario a comprender y/o evaluar las posibles opciones de diagnóstico y tratamiento. No incluye toda la información sobre afecciones, tratamientos, medicamentos, efectos secundarios o riesgos puedan ser aplicables a un paciente específico. No tiene el propósito de servir hailey recomendación médica ni de sustituir la recomendación médica, el diagnóstico o el tratamiento de un profesional de atención médica que se base en el examen y la evaluación de mckenzie profesional de la surinder respecto a las circunstancias específicas y únicas del paciente. Los pacientes deben hablar con un profesional de atención médica para obtener información completa sobre rodriguez surinder, cuestiones médicas y opciones de tratamiento, incluidos los riesgos o los beneficios relacionados con el uso de medicamentos. Esta información no certifica que los  "tratamientos o medicamentos aye seguros, eficaces o estén aprobados para tratar a un paciente específico. UpToDate, Inc. y jeff afiliados renuncian a cualquier garantía o responsabilidad relacionada con esta información o el uso de la misma.El uso de esta información está sujeto a las Condiciones de uso, disponibles en [email or web address]/en/know/clinical-effectiveness-terms. 2024© [employer] y jeff afiliados y/o licenciantes. Todos los derechos reservados.  Copyright   © 2024 [employer] Todos los derechos reservados.  Patient Education     Examen físico de rutina para adultos   Conceptos Básicos   Redactado por los médicos y editores de UpToDate   ¿Qué es un examen físico? -- Un examen físico es brandie consulta de rutina o \"revisión\" con rodriguez médico. También se conoce hailey \"consulta de bienestar\" o \"consulta preventiva\".  Talita cada consulta, el médico hará lo siguiente:   Preguntará por rodriguez surinder física y mental   Preguntará sobre jeff hábitos, conductas y estilo de juju   Le hará un examen   Le administrará las vacunas que aye necesarias   Hablará con usted sobre cualquier medicina que tome   Le dará consejos sobre rodriguez surinder   Responderá jeff preguntas  Hacerse revisiones periódicas es brandie parte importante del cuidado de rodriguez surinder. Puede ayudar a rodirguez médico a encontrar y tratar cualquier problema que tenga. [place] también es importante para prevenir problemas de surinder.  Un examen físico de rutina es diferente de brandie \"consulta por enfermedad\". Brandie consulta por enfermedad es cuando lo atiende un médico debido a un problema o inquietud de surinder. Dado que los exámenes físicos se programan con anticipación, usted puede pensar en lo que quiere preguntarle al médico.  ¿Con qué frecuencia ayden hacerme un examen físico? -- Depende de rodriguez edad y de rodriguez estado de surinder. En general, en el rey de las personas mayores de 21 años:   Si tiene menos de 50 años, es posible que pueda hacerse un examen físico cada 3 años.   Si " "tiene 50 años o más, rodriguez médico podría recomendarle un examen físico cada año.  Si tiene un padecimiento de surinder crónico, humberto hailey diabetes o presión arterial malu, rodriguez médico probablemente querrá verlo con más frecuencia.  ¿Qué sucede diamond un examen físico? -- En general, cada consulta incluirá:   Examen físico - El médico o enfermero revisará rodriguez estatura, peso, frecuencia cardíaca y presión arterial. También le examinará los ojos y los oídos. Le preguntará cómo se siente y si tiene algún síntoma que le moleste.   Medicinas - Es brandie buena idea llevar brandie lista de todos las medicinas que thanh cada vez que acude a la consulta médica. Rodriguez médico le hablará sobre bre medicinas y responderá a bre preguntas. Dígale si tiene algún efecto secundario que le moleste. También debe informarle si tiene dificultades para pagar alguna de bre medicinas.   Hábitos y comportamientos - [place] incluye:   Rodriguez dieta   Bre hábitos de ejercicio   Si fuma, lizeth alcohol o consume drogas   Si es sexualmente activo   Si se siente seguro en casa  Rodriguez médico hablará con usted sobre las cosas que puede hacer para mejorar rodriguez surinder y reducir el riesgo de tener problemas de surinder. También ofrecerá ayuda y apoyo. Por ejemplo, si quiere dejar de fumar, puede darle consejos y recetarle medicinas. Si quiere mejorar rodriguez alimentación o realizar más actividad física, rodriguez médico también puede ayudarlo a lograr estos objetivos.   Pruebas de laboratorio, si son necesarias - Las pruebas que le realicen dependerán de rodriguez edad y situación. Por ejemplo, es posible que rodriguez médico quiera revisar rodriguez:   Colesterol   Azúcar en jose   Nivel de tessy   Vacunas - Las vacunas recomendadas dependerán de rodriguez edad, rodriguez surinder y de las vacunas que ya haya recibido. Las vacunas son muy importantes porque pueden prevenir ciertas infecciones graves o mortales.   Análisis sobre las pruebas de detección - \"Detección\" significa revisar si hay enfermedades u otros problemas de surinder antes " de que causen síntomas. Rodriguez médico puede recomendar pruebas de detección según rodriguez edad, riesgo y preferencias. [place] podría incluir pruebas para detectar:   Cáncer, hailey cáncer de seno, próstata, gokul uterino, ovario, colorrectal, próstata, pulmón o piel   Infecciones de transmisión sexual tales hailey clamidia y gonorrea   Padecimientos de surinder mental tales hailey depresión y ansiedad.  El médico le hablará sobre los diferentes tipos de pruebas de detección. Puede ayudarlo a decidir qué pruebas de detección debe hacerse. También le puede explicar lo que podrían significar los resultados.   Responder preguntas - El examen físico es un buen momento para hacerle preguntas al médico o enfermero sobre rodriguez surinder. Si es necesario, también puede derivarlo a otros médicos o especialistas.  Los adultos mayores de 65 años a menudo también necesitan otros cuidados. A medida que envejece, rodriguez médico hablará con usted sobre:   Cómo evitar las caídas en el hogar   Pruebas de audición o visión   Pruebas de memoria   Cómo octavia jeff medicinas de manera [place]   Asegurarse de tener la ayuda y el apoyo que necesita en casa  Todos los artículos se actualizan a medida que se descubre nueva evidencia y culmina nuestro proceso de evaluación por homólogos   Mckenzie artículo se recuperó de UpToDate el: May 02, 2024.  Artículo [id number] Versión [id number]  Release: 32.4.3 - C32.122  © 2024 UpToDate, Inc. Todos los derechos reservados.  Exención de responsabilidad y uso de la información del consumidor   Descargo de responsabilidad: esta información generalizada es un resumen limitado de información sobre el diagnóstico, el tratamiento y/o los medicamentos. No pretende ser exhaustiva y se debe utilizar hailey herramienta para ayudar al usuario a comprender y/o evaluar las posibles opciones de diagnóstico y tratamiento. No incluye toda la información sobre afecciones, tratamientos, medicamentos, efectos secundarios o riesgos puedan ser aplicables a un  paciente específico. No tiene el propósito de servir hailey recomendación médica ni de sustituir la recomendación médica, el diagnóstico o el tratamiento de un profesional de atención médica que se base en el examen y la evaluación de april profesional de la surinder respecto a las circunstancias específicas y únicas del paciente. Los pacientes deben hablar con un profesional de atención médica para obtener información completa sobre rodriguez surinder, cuestiones médicas y opciones de tratamiento, incluidos los riesgos o los beneficios relacionados con el uso de medicamentos. Esta información no certifica que los tratamientos o medicamentos aye seguros, eficaces o estén aprobados para tratar a un paciente específico. [employer] y jeff afiliados renuncian a cualquier garantía o responsabilidad relacionada con esta información o el uso de la misma.El uso de esta información está sujeto a las Condiciones de uso, disponibles en [email or web address]/know/clinical-effectiveness-terms. 2024© [employer] y jeff afiliados y/o licenciantes. Todos los derechos reservados.  Copyright   © 2024 [employer] Todos los derechos reservados.

## 2025-08-05 DIAGNOSIS — E78.2 MIXED HYPERLIPIDEMIA: ICD-10-CM

## 2025-08-07 RX ORDER — ATORVASTATIN CALCIUM 40 MG/1
40 TABLET, FILM COATED ORAL DAILY
Qty: 90 TABLET | Refills: 1 | Status: SHIPPED | OUTPATIENT
Start: 2025-08-07